# Patient Record
Sex: MALE | Race: WHITE
[De-identification: names, ages, dates, MRNs, and addresses within clinical notes are randomized per-mention and may not be internally consistent; named-entity substitution may affect disease eponyms.]

---

## 2016-05-25 VITALS — DIASTOLIC BLOOD PRESSURE: 86 MMHG | SYSTOLIC BLOOD PRESSURE: 112 MMHG

## 2017-01-06 ENCOUNTER — HOSPITAL ENCOUNTER (OUTPATIENT)
Dept: HOSPITAL 44 - RAD | Age: 62
End: 2017-01-06
Attending: FAMILY MEDICINE
Payer: MEDICARE

## 2017-01-06 DIAGNOSIS — M25.511: Primary | ICD-10-CM

## 2017-01-06 PROCEDURE — 73030 X-RAY EXAM OF SHOULDER: CPT

## 2017-01-06 NOTE — DIAGNOSTIC IMAGING REPORT
[facility]

[address] [facility].[address] [address]

 

 

 

 

Report Submission Date: 2017 3:25:24 PM CST

Patient       Study

Name:   FILEMON WEN       Date:   2017 2:34:09 PM CST

MRN:   [id number]       Modality Type:   CR

Gender:   M       Description:   SHOULDER

:   51       Institution:   [facility]

Physician:   TERRENCE HERNANDEZ            

 

 

Right shoulder - three views 

Clinical history:  Pain for 2 months decreased range of motion. 

Findings:  Examination right shoulder multiple views demonstrates degenerative 
changes in the acromioclavicular joint with mild narrowing of the joint space 
and osteophyte formation.  The glenohumeral relationship appears anatomic.  
There is no evident fracture and no lytic or blastic lesion. 

Impression: 

1.  Degenerative changes in the acromioclavicular joint.

 

Electronically signed on 2017 3:25:24 PM CST by:

Shant DEL CID

## 2017-03-31 ENCOUNTER — HOSPITAL ENCOUNTER (EMERGENCY)
Dept: HOSPITAL 44 - ED | Age: 62
Discharge: ANOTHER HOSPITAL | End: 2017-03-31
Payer: MEDICARE

## 2017-03-31 VITALS
SYSTOLIC BLOOD PRESSURE: 130 MMHG | DIASTOLIC BLOOD PRESSURE: 93 MMHG | SYSTOLIC BLOOD PRESSURE: 130 MMHG | DIASTOLIC BLOOD PRESSURE: 93 MMHG | DIASTOLIC BLOOD PRESSURE: 93 MMHG | SYSTOLIC BLOOD PRESSURE: 130 MMHG
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
## 2017-03-31 DIAGNOSIS — T18.128A: Primary | ICD-10-CM

## 2017-03-31 PROCEDURE — 99284 EMERGENCY DEPT VISIT MOD MDM: CPT

## 2017-03-31 PROCEDURE — S1016 NON-PVC INTRAVENOUS ADMINIST: HCPCS

## 2017-03-31 PROCEDURE — 96374 THER/PROPH/DIAG INJ IV PUSH: CPT

## 2017-03-31 PROCEDURE — 99283 EMERGENCY DEPT VISIT LOW MDM: CPT

## 2017-03-31 PROCEDURE — 96376 TX/PRO/DX INJ SAME DRUG ADON: CPT

## 2017-03-31 NOTE — ED PHYSICIAN DOCUMENTATION
General Adult





- HISTORIAN


Historian: patient





- HPI


Stated Complaint: food lodged in esophagus


Chief Complaint: General Adult


Onset: hours


Timing: still present


Severity: mild


Further Comments: yes (Pt is a 60 yo male who says he has food lodged in his 

esophagus.  Pt appears comfortable and has no airway compromise.  Pt has had 

this problem before.  Pt states that he has had balloon dilitation of his 

esophagus in the past.)





- ROS


CONST: no problems


EYES/ENT: none


CVS/RESP: none


GI/: other (food lodged in esophagus)


MS/SKIN/LYMPH: none





- PAST HX


Past History: other (GERD, HTN, esophageal dilitation)





- SOCIAL HX


Smoking History: non-smoker





- FAMILY HX


Family History: No





- VITAL SIGNS


Vital Signs: 





 Vital Signs











Temp Pulse Resp BP Pulse Ox


 


          112/86    


 


          05/24/16 22:35   














- REVIEWED ASSESSMENTS


Nursing Assessment  Reviewed: Yes


Vitals Reviewed: Yes





<Tyrese Briones - Last Filed: 03/31/17 19:01>





- VITAL SIGNS


Vital Signs: 





 Vital Signs











Temp Pulse Resp BP Pulse Ox


 


 97.5 F L  96 H  16   118/95   99 


 


 03/31/17 18:15  03/31/17 18:15  03/31/17 18:15  03/31/17 18:15  03/31/17 18:15














<AUDRA RODRIGUEZ - Last Filed: 03/31/17 21:01>





- PAST HX


Allergies/Adverse Reactions: 


 Allergies











Allergy/AdvReac Type Severity Reaction Status Date / Time


 


codeine [Codeine] Allergy Severe Anaphylaxis Verified 03/31/17 18:24














Home Medications: 


 Ambulatory Orders











 Medication  Instructions  Recorded


 


Ranitidine HCl 150 mg PO QDAY 03/31/17














Progress





- Progress


Progress: 





Glucagon 1 mg IV





Care transferred to Audra Rodriguez at 1900.





Will repeat Glucagon in 30 min.  If sx not resolved will transfer to [facility]  

Dr. Burk.  Case discussed with Dr. Burk.  GI, [facility]





<Tyrese Briones - Last Filed: 03/31/17 19:01>





- Progress


Progress: 


Patient walking in hallway. 





Patient spitting in cup, unable to swallow his secretions. Extended wait time 

due to code in ER





2000 Second glucagon given, patient walking around





2040 Spoke with DR Burk at [facility], patient not accepted,  admission advisor to 

speak with ER attending. 





2100 Patient accepted by Dr Heredia, ER attending.  Patient updated on plan of 

care.  Explained he would need a ride home from [facility], states he is called a 

friend.  Explained he would be discharged home post procedure.








<AUDRA RODRIGUEZ - Last Filed: 03/31/17 21:01>





ED Results Lab/Radiology





- Orders


Orders: 





 ED Orders











 Category Date Time Status


 


 Amoxicillin/Potassium Clav [Augmentin 875Mg/125Mg] Med  03/31/17 18:40 

Discontinued





 1 each PO NOW ONE   


 


 Glucagon,Human Recombinant [Glucagen] Med  03/31/17 18:46 Discontinued





 1 mg .ROUTE .STK-MED ONE   


 


 Glucagon,Human Recombinant [Glucagen] Med  03/31/17 18:45 Discontinued





 1 mg IVP NOW ONE   


 


 Glucagon,Human Recombinant [Glucagen] Med  03/31/17 19:56 Discontinued





 1 mg IVP NOW ONE   














<AUDRA RODRIGUEZ - Last Filed: 03/31/17 21:01>





General Adult Physical Exam





- PHYSICAL EXAM


GENERAL APPEARANCE: no distress


EENT: pharynx normal


NECK: normal inspection, supple


RESPIRATORY: no resp distress, chest non-tender


CVS: reg rate & rhythm, heart sounds normal


ABDOMEN: soft, no organomegaly, normal bowel sounds


BACK: normal inspection


SKIN: warm/dry, normal color


EXTREMITIES: non-tender, normal range of motion, no evidence of injury


NEURO: oriented X3, motor nml, sensation nml





<Tyrese Briones - Last Filed: 03/31/17 19:01>





Discharge


Decision to Admit: NO





<Tyrese Briones - Last Filed: 03/31/17 19:01>


Decision to Admit: NO


Decision Time: 21:00





<AUDRA RODRIGUEZ - Last Filed: 03/31/17 21:01>


Clincal Impression: 


 food lodged in esophagus





Referrals: 


Zafar Daniels MD [Primary Care Provider] - 


Home Medications: 


Ambulatory Orders





Ranitidine HCl 150 mg PO QDAY 03/31/17 








Condition: Stable


Disposition: 02 XFER SHT-[facility]

## 2017-05-24 ENCOUNTER — HOSPITAL ENCOUNTER (OUTPATIENT)
Dept: HOSPITAL 44 - RT | Age: 62
End: 2017-05-24
Attending: FAMILY MEDICINE
Payer: MEDICARE

## 2017-05-24 DIAGNOSIS — J96.10: Primary | ICD-10-CM

## 2017-07-03 ENCOUNTER — HOSPITAL ENCOUNTER (OUTPATIENT)
Dept: HOSPITAL 44 - RAD | Age: 62
End: 2017-07-03
Attending: FAMILY MEDICINE
Payer: COMMERCIAL

## 2017-07-03 DIAGNOSIS — M25.551: Primary | ICD-10-CM

## 2017-07-03 PROCEDURE — 73521 X-RAY EXAM HIPS BI 2 VIEWS: CPT

## 2017-07-03 NOTE — DIAGNOSTIC IMAGING REPORT
[facility]

[address] [facility].[address] [address]

 

 

 

 

Report Submission Date: Jul 3, 2017 2:19:16 PM CDT

Patient       Study

Name:   FILEMON WEN       Date:   Jul 3, 2017 1:55:26 PM CDT

MRN:   [id number]       Modality Type:   CR

Gender:   M       Description:   PELVIS

:   51       Institution:   [facility]

Physician:   DAVID OCAMPO - YUNIEL

         

 

 

Examination: Plain film pelvis/hips 



History: Discomfort 



Comparison exams: None provided 



Findings: 5 views of the pelvis/hips demonstrates normal cortical margins. No 
fracture.  No dislocation.  Superior and inferior pubic rami and iliac wings 
are without abnormality. 



Impression: No acute osseous process.

 

Electronically signed on Jul 3, 2017 2:19:16 PM CDT by:

Abad DEL CID

## 2017-08-10 ENCOUNTER — HOSPITAL ENCOUNTER (EMERGENCY)
Dept: HOSPITAL 44 - ED | Age: 62
Discharge: HOME | End: 2017-08-10
Payer: COMMERCIAL

## 2017-08-10 VITALS — SYSTOLIC BLOOD PRESSURE: 138 MMHG | DIASTOLIC BLOOD PRESSURE: 92 MMHG

## 2017-08-10 DIAGNOSIS — M43.6: Primary | ICD-10-CM

## 2017-08-10 PROCEDURE — 72040 X-RAY EXAM NECK SPINE 2-3 VW: CPT

## 2017-08-10 PROCEDURE — 96372 THER/PROPH/DIAG INJ SC/IM: CPT

## 2017-08-10 PROCEDURE — 99283 EMERGENCY DEPT VISIT LOW MDM: CPT

## 2017-08-10 RX ADMIN — KETOROLAC TROMETHAMINE ONE MG: 30 INJECTION, SOLUTION INTRAMUSCULAR at 12:21

## 2017-08-10 RX ADMIN — ORPHENADRINE CITRATE ONE MG: 30 INJECTION, SOLUTION INTRAMUSCULAR; INTRAVENOUS at 12:20

## 2017-08-10 NOTE — ED PHYSICIAN DOCUMENTATION
Neck Injury/Pain





- HISTORIAN


Historian: patient





- HPI


Stated Complaint: neck pain


Chief Complaint: Neck Pain


Additional Information: 





x 2 days


Onset: days ago (2)


Duration: continues in ED


Recent Injury: No


Context: other (woke this way)


Where: home


Other Injuries: other (no injury)


Severity: moderate


Quality: sharp


Front/Back of Body, Lg (Color): 


  __________________________














  __________________________





 1 - pain





 2 - pain





Associated Symptoms: other (none)


Exacerbated By: movement of neck


Relieved By: remaining still


Further Comments: no





- ROS


NEURO/PSYCH: denies: difficulty with speech, anxiety, depression


EYES/ENT: none


CVS/RESP: none


CONST: no problems


GI/: denies: nausea, vomiting, problems urinating, incontinence


MS/SKIN/LYMPH: none





- PAST HX


Past History: other (htn)


Cardiac Risk Factors: hypertension


Surgeries/Procedures: none


CT/MRI: No


Immunizations: referred to PCP


Allergies/Adverse Reactions: 


 Allergies











Allergy/AdvReac Type Severity Reaction Status Date / Time


 


codeine [Codeine] Allergy Severe Anaphylaxis Verified 08/10/17 11:30














Home Medications: 


 Ambulatory Orders











 Medication  Instructions  Recorded


 


Losartan/Hydrochlorothiazide 1 each PO QDAY 08/10/17





[Hyzaar 100-25 Tablet]  


 


Metoprolol Tartrate [Lopressor] 50 mg PO DAILY 08/10/17


 


Pantoprazole Sodium [Protonix] 40 mg PO 0700 08/10/17


 


Tamsulosin HCl [Flomax] 0.4 mg PO [id number] 08/10/17














- SOCIAL HX


Smoking History: non-smoker


Alcohol Use: none


Drug Use: none





- FAMILY HX


Family History: no significant history





- VITAL SIGNS


Vital Signs: 





 Vital Signs











Temp Pulse Resp BP Pulse Ox


 


 97.3 F L  89   16   138/92   96 


 


 08/10/17 12:28  08/10/17 12:28  08/10/17 12:28  08/10/17 12:28  08/10/17 12:28














- REVIEWED ASSESSMENT


Nursing Assessment  Reviewed: Yes


Vitals Reviewed: Yes





Progress





- Results/Orders


Results/Orders: 





x-ray c-spine ordered





- Progress


Progress: 





pt. stable entire time in er, given norflex 60 mg im, toradol 60 mg im in er





Critical Care Note





- Critical Care Note


Total Time (mins): 0





ED Results Lab/Radiology





- Lab Results


Lab Results: 





none ordered





- Radiology


Radiology Impressions: 





ddd, djd, no acute fx





- Orders


Orders: 





 ED Orders











 Category Date Time Status


 


 C SPINE 2 OR 3 VIEWS [RAD] Stat Exams  08/10/17 Ordered


 


 Ketorolac Tromethamine [Toradol] Med  08/10/17 12:13 Discontinued





 60 mg IM NOW ONE   


 


 Orphenadrine Citrate [Norflex] Med  08/10/17 12:13 Discontinued





 60 mg IM NOW ONE   














Neck Injury/Pain





- Physical Exam


General Appearance: alert, moderate distress


EENT: nml ENT inspection, pharynx nml


Neck: thyroid nml, muscle spasm (bilat paracervical muscles), decreased ROM


Nexus Criteria: Nexus criteria neg


Back: non-tender, painless ROM


Respiratory: chest non-tender, breath sounds nml


CVS: heart sounds nml, bilateral pulses nml


Abdomen: non-tender, no organomegaly, nml bowel sounds, no distention


Skin: warm/dry, normal color


Extremities: non-tender, normal range of motion, no evidence of injury, no edema


Neuro/Psych: oriented x3, CN's nml as tested, sensation nml, motor nml, mood/

affect nml,  nml, reflexes nml,  symmetrical





Discharge


Clincal Impression: 


 Torticollis, acute





Referrals: 


Primary Doctor,No [Primary Care Provider] - 2 Days


Home Medications: 


Ambulatory Orders





Losartan/Hydrochlorothiazide [Hyzaar 100-25 Tablet] 1 each PO QDAY 08/10/17 


Metoprolol Tartrate [Lopressor] 50 mg PO DAILY 08/10/17 


Pantoprazole Sodium [Protonix] 40 mg PO 0700 08/10/17 


Tamsulosin HCl [Flomax] 0.4 mg PO [id number] 08/10/17 








Comments: 





discharged with scripts for parafon forte dsc 500 mg 1 p.i. qid #20, toradol 10 

1 p.o. qid #20


Condition: Stable


Disposition: 01 HOME, SELF-CARE


Decision to Admit: NO


Decision Time: 12:20

## 2017-08-10 NOTE — DIAGNOSTIC IMAGING REPORT
ALLIE SANTOS 

[facility]

[address] [facility].[address] [address]

 

 

 

 

Report Submission Date: Aug 10, 2017 12:31:53 PM CDT

Patient       Study

Name:   FILEMON WEN       Date:   Aug 10, 2017 11:45:17 AM CDT

MRN:   [id number]       Modality Type:   CR

Gender:   M       Description:   SPINE

:   51       Institution:   [facility]

Physician:   ALLIE SANTOS

         

 

 

Cervical spine -three views 



CLINICAL HISTORY:   

Upper neck pain for 3 days.   



FINDINGS:   

Examination cervical spine AP, lateral, lateral swimmer's and open-mouth views 
demonstrates the vertebrae to be anatomically aligned.  Prevertebral soft 
tissues are within normal limits.  The C1-2 articulation is normal and the base 
the odontoid is intact.  There is no evident fracture. 



IMPRESSION:   

No fracture.

 

Electronically signed on Aug 10, 2017 12:31:53 PM CDT by:

Shant DEL CID

## 2017-08-11 ENCOUNTER — HOSPITAL ENCOUNTER (EMERGENCY)
Dept: HOSPITAL 44 - ED | Age: 62
Discharge: HOME | End: 2017-08-11
Payer: COMMERCIAL

## 2017-08-11 VITALS — DIASTOLIC BLOOD PRESSURE: 80 MMHG | SYSTOLIC BLOOD PRESSURE: 130 MMHG

## 2017-08-11 DIAGNOSIS — M43.6: Primary | ICD-10-CM

## 2017-08-11 PROCEDURE — 99283 EMERGENCY DEPT VISIT LOW MDM: CPT

## 2017-08-11 NOTE — ED PHYSICIAN DOCUMENTATION
Neck Injury/Pain





- HISTORIAN


Historian: patient





- HPI


Stated Complaint: neck pain


Chief Complaint: Neck Pain


Additional Information: 





was here yesterday, states meds not working


Onset: days ago (5)


Duration: continues in ED


Recent Injury: Yes


Context: other (unknown)


Where: home


Other Injuries: neck


Severity: moderate


Quality: sharp


Associated Symptoms: denies: fever, chills, sweating, headache, chest pain, 

weakness, numbness, tingling


Exacerbated By: movement of neck


Relieved By: nothing


Further Comments: no





- ROS


NEURO/PSYCH: denies: difficulty with speech, anxiety, depression


EYES/ENT: none


CVS/RESP: none


CONST: no problems


GI/: denies: nausea, vomiting, problems urinating, incontinence


MS/SKIN/LYMPH: none





- PAST HX


Past History: arthritis, neck pain


Surgeries/Procedures: none


Immunizations: referred to PCP


Allergies/Adverse Reactions: 


 Allergies











Allergy/AdvReac Type Severity Reaction Status Date / Time


 


codeine [Codeine] Allergy Severe Anaphylaxis Verified 08/11/17 10:49














Home Medications: 


 Ambulatory Orders











 Medication  Instructions  Recorded


 


Losartan/Hydrochlorothiazide 1 each PO QDAY 08/10/17





[Hyzaar 100-25 Tablet]  


 


Metoprolol Tartrate [Lopressor] 50 mg PO DAILY 08/10/17


 


Pantoprazole Sodium [Protonix] 40 mg PO 0700 08/10/17


 


Tamsulosin HCl [Flomax] 0.4 mg PO [id number] 08/10/17














- SOCIAL HX


Smoking History: non-smoker


Alcohol Use: none


Drug Use: none





- FAMILY HX


Family History: no significant history





- VITAL SIGNS


Vital Signs: 


 Vital Signs











Temp Pulse Resp BP Pulse Ox


 


    80   14   130/80   98 


 


    08/11/17 11:40  08/11/17 11:40  08/11/17 11:40  08/11/17 11:40














- REVIEWED ASSESSMENT


Nursing Assessment  Reviewed: Yes


Vitals Reviewed: Yes





Progress





- Results/Orders


Results/Orders: 





no testing ordered





- Progress


Progress: 





pt. given percocet 5/325 p.o. in er





Critical Care Note





- Critical Care Note


Total Time (mins): 0





ED Results Lab/Radiology





- Lab Results


Lab Results: 


none ordered





- Radiology


Radiology Impressions: 


none ordered today, ordered and reviewed yesterday





- Orders


Orders: 


 ED Orders











 Category Date Time Status


 


 oxyCODONE HCL/ACETAMINOPHEN [Percocet 5-325 mg Tablet] Med  08/11/17 11:28 

Discontinued





 1 each PO NOW ONE   














Neck Injury/Pain





- Physical Exam


General Appearance: alert, moderate distress


EENT: nml ENT inspection, pharynx nml, scleral icterus


Neck: thyroid nml, muscle spasm, decreased ROM


Back: non-tender, painless ROM.  No: vertebral point-tendernes, CVA tenderness, 

muscle spasm


Respiratory: chest non-tender, breath sounds nml


CVS: heart sounds nml, bilateral pulses nml


Abdomen: non-tender, no organomegaly, nml bowel sounds, no distention


Skin: warm/dry, normal color


Extremities: non-tender, normal range of motion, no evidence of injury, no edema


Neuro/Psych: oriented x3, CN's nml as tested, sensation nml, motor nml, mood/

affect nml,  nml, reflexes nml,  symmetrical





Discharge


Clincal Impression: 


 Torticollis





Referrals: 


Primary Doctor,No [Primary Care Provider] - 2 Days


Home Medications: 


Ambulatory Orders





Losartan/Hydrochlorothiazide [Hyzaar 100-25 Tablet] 1 each PO QDAY 08/10/17 


Metoprolol Tartrate [Lopressor] 50 mg PO DAILY 08/10/17 


Pantoprazole Sodium [Protonix] 40 mg PO 0700 08/10/17 


Tamsulosin HCl [Flomax] 0.4 mg PO [id number] 08/10/17 








Comments: 





discharged in stable condition with script for percocet 5/325 1 p.o. qid prn 

pain


Condition: Stable


Disposition: 01 HOME, SELF-CARE


Decision to Admit: NO


Decision Time: 11:34

## 2017-10-03 ENCOUNTER — HOSPITAL ENCOUNTER (EMERGENCY)
Dept: HOSPITAL 44 - ED | Age: 62
Discharge: HOME | End: 2017-10-03
Payer: COMMERCIAL

## 2017-10-03 VITALS — SYSTOLIC BLOOD PRESSURE: 111 MMHG | DIASTOLIC BLOOD PRESSURE: 82 MMHG

## 2017-10-03 DIAGNOSIS — I10: Primary | ICD-10-CM

## 2017-10-03 PROCEDURE — 99283 EMERGENCY DEPT VISIT LOW MDM: CPT

## 2017-10-03 NOTE — ED PHYSICIAN DOCUMENTATION
General Adult





- HISTORIAN


Historian: patient





- HPI


Stated Complaint: high blood pressure 


Chief Complaint: General Adult


Additional Information: 





hypertension reading at home 135/119


Onset: hours (1)


Timing: better


Severity: other (no pain )


Further Comments: no


Last known Well Date: 10/03/17


Last Known Well Time: 13:00


Last known Well Code/Unknown Code: Unknown





- ROS


CONST: no problems


EYES/ENT: none


CVS/RESP: none


GI/: none


MS/SKIN/LYMPH: none


NEURO/PSYCH: denies: headache, fainting, dizziness





- PAST HX


Past History: other (HTN )


Other History: none


Surgeries/Procedures: none


Immunizations: referred to PCP


Allergies/Adverse Reactions: 


 Allergies











Allergy/AdvReac Type Severity Reaction Status Date / Time


 


codeine [Codeine] Allergy Severe Anaphylaxis Verified 08/11/17 10:49














Home Medications: 


 Ambulatory Orders











 Medication  Instructions  Recorded


 


Losartan/Hydrochlorothiazide 1 each PO QDAY 08/10/17





[Hyzaar 100-25 Tablet]  


 


Metoprolol Tartrate [Lopressor] 50 mg PO DAILY 08/10/17














- SOCIAL HX


Smoking History: non-smoker


Alcohol Use: none


Drug Use: none





- FAMILY HX


Family History: No





- VITAL SIGNS


Vital Signs: 





 Vital Signs











Temp Pulse Resp BP Pulse Ox


 


          130/80    


 


          08/11/17 11:40   














- REVIEWED ASSESSMENTS


Nursing Assessment  Reviewed: Yes


Vitals Reviewed: Yes





General Adult Physical Exam





- PHYSICAL EXAM


GENERAL APPEARANCE: no distress


EENT: eye inspection normal, ENT inspection normal


NECK: normal inspection


CVS: reg rate & rhythm, heart sounds normal, equal pulses, no murmur


ABDOMEN: soft, no organomegaly, normal bowel sounds, no distension


BACK: normal inspection


SKIN: warm/dry


EXTREMITIES: non-tender, normal range of motion


NEURO: oriented X3, CN's nml as tested





Discharge


Clincal Impression: 


 Accelerated hypertension





Referrals: 


Annie Lewis PRN [Primary Care Provider] - 2 Days


Condition: Stable


Disposition: 01 HOME, SELF-CARE


Decision to Admit: NO


Date of Decison to Admit: 10/03/17


Decision Time: 14:21

## 2017-10-14 ENCOUNTER — HOSPITAL ENCOUNTER (EMERGENCY)
Dept: HOSPITAL 44 - ED | Age: 62
Discharge: HOME | End: 2017-10-14
Payer: COMMERCIAL

## 2017-10-14 VITALS — DIASTOLIC BLOOD PRESSURE: 58 MMHG | SYSTOLIC BLOOD PRESSURE: 119 MMHG

## 2017-10-14 DIAGNOSIS — J45.41: Primary | ICD-10-CM

## 2017-10-14 PROCEDURE — 71020: CPT

## 2017-10-14 PROCEDURE — 96372 THER/PROPH/DIAG INJ SC/IM: CPT

## 2017-10-14 PROCEDURE — 99283 EMERGENCY DEPT VISIT LOW MDM: CPT

## 2017-10-14 RX ADMIN — IPRATROPIUM BROMIDE AND ALBUTEROL SULFATE ONE ML: .5; 3 SOLUTION RESPIRATORY (INHALATION) at 01:50

## 2017-10-14 RX ADMIN — DEXAMETHASONE SODIUM PHOSPHATE ONE MG: 4 INJECTION, SOLUTION INTRAMUSCULAR; INTRAVENOUS at 02:18

## 2017-10-14 RX ADMIN — BENZONATATE ONE MG: 100 CAPSULE ORAL at 02:21

## 2017-10-14 RX ADMIN — BUDESONIDE ONE MG: 0.5 SUSPENSION RESPIRATORY (INHALATION) at 02:00

## 2017-10-14 NOTE — DIAGNOSTIC IMAGING REPORT
ALLIE SANTOS 

[facility]

[address] [facility] [address] [address]

 

 

 

 

Report Submission Date: Oct 14, 2017 2:36:45 AM CDT

Patient       Study

Name:   FILEMON WEN       Date:   Oct 14, 2017 2:05:57 AM CDT

MRN:   [id number]       Modality Type:   CR

Gender:   M       Description:   CHEST

:   51       Institution:   [facility]

Physician:   ALLIE SNATOS

         

 

 

Chest 2 views 



History: Cough and shortness of breath 



Findings: Low lung volumes are observed with bronchovascular crowding at the 
lung bases. Basilar bronchitis cannot be excluded. Heart size and pulmonary 
vascularity are normal. No pleural effusions are observed. Osseous structures 
are unremarkable. 



Impression: Limited exam due to expiratory technique. Basilar bronchitis cannot 
be excluded.

 

Electronically signed on Oct 14, 2017 2:36:45 AM CDT by:

Christ DEL CID

## 2017-10-14 NOTE — ED PHYSICIAN DOCUMENTATION
Upper Respiratory Symptoms





- HISTORIAN


Historian: patient





- HPI


Stated Complaint: cough


Chief Complaint: Cough/ Upper Respiratory


Additional Information: 





cough x 1 week, occ. productive


Onset: days ago (7)


Duration: sudden-Onset


Context: denies: recent foreign travel, insect bite(s), tick(s), recent 

chemotherapy, multiple patients, same sx


Severity: moderate


Associated Symptoms: productive cough


Worsened by Deep Breath: Yes


Further Comments: no





- ROS


CONST/EYES: denies: weakness, eye redness, eye itching


CVS/RESP: other (cough, worse at night)


LYMPH: denies: leg swelling, rash, swollen glands, ankle swelling


GI/: none


NEURO/PSYCH: denies: fainting, dizziness, confusion, anxiety, depression


MS/SKIN: denies: joint pain, muscle aches, rash





- PAST HX


Lung Disease: asthma


PE Risk Factors: hypertension


Surgeries/Procedures: none


Immunizations: referred to PCP


Allergies/Adverse Reactions: 


 Allergies











Allergy/AdvReac Type Severity Reaction Status Date / Time


 


codeine [Codeine] Allergy Severe Anaphylaxis Verified 10/14/17 01:46














Home Medications: 


 Ambulatory Orders











 Medication  Instructions  Recorded


 


Losartan/Hydrochlorothiazide 1 each PO QDAY 08/10/17





[Hyzaar 100-25 Tablet]  


 


Metoprolol Tartrate [Lopressor] 50 mg PO DAILY 08/10/17














- SOCIAL HX


Smoking History: non-smoker


Alcohol Use: none


Drug Use: none





- FAMILY HX


Family History: no significant history





- VITAL SIGNS


Vital Signs: 


 Vital Signs











Temp Pulse Resp BP Pulse Ox


 


 97.8 F   80   16   119/53   95 


 


 10/14/17 01:35  10/14/17 01:35  10/14/17 01:35  10/14/17 01:35  10/14/17 01:35














- REVIEWED ASSESSMENTS


Nursing Assessment  Reviewed: Yes


Vitals Reviewed: Yes





Progress





- Results/Orders


Results/Orders: 


cxr ordered





- Progress


Progress: 





pt. given pulmicort 0.5 mg via nebulizer, duoneb via nebulizer, 80 mg depo 

medrol, 8 mg decadron, 1.2 million units bicillinim in er





Critical Care Note





- Critical Care Note


Total Time (mins): 0





ED Results Lab/Radiology





- Lab Results


Lab Results: 





none ordered





- Radiology


Radiology Impressions: 





cxr clear of infiltrates





- Orders


Orders: 


 ED Orders











 Category Date Time Status


 


 CHEST 2 VIEW [CHEST P.A.&LAT 2 VIEWS] [RAD] Stat Exams  10/14/17 Taken


 


 Benzonatate [Tessalon] Med  10/14/17 01:52 Discontinued





 200 mg PO NOW ONE   


 


 Budesonide [Pulmicort] Med  10/14/17 01:49 Discontinued





 0.5 mg NEB .STK-MED ONE   


 


 Budesonide [Pulmicort] Med  10/14/17 01:50 Discontinued





 0.5 mg NEB 1T ONE   


 


 Dexamethasone Sod Phosphate [Decadron] Med  10/14/17 01:49 Discontinued





 8 mg IM NOW ONE   


 


 Ipratropium/Albuterol Sulfate [Duoneb] Med  10/14/17 01:48 Discontinued





 3 ml NEB .STK-MED ONE   


 


 Ipratropium/Albuterol Sulfate [Duoneb] Med  10/14/17 01:49 Discontinued





 3 ml NEB NOW ONE   


 


 Penicillin G Benzathine [Bicillin l-A] Med  10/14/17 02:33 Once





 1,200,000 units IM NOW ONE   


 


 methylPREDNISolone ACETATE [Depo-Medrol] Med  10/14/17 01:51 Discontinued





 80 mg IM NOW ONE   














Upper Respiratory Symptoms





- EXAM


General Appearance: mild distress


EENT: eyes nml inspection, nml ENT inspection, lids & conjunct. nml, PERRL, ear 

nml.  No: pain over sinuses


Neck: normal inspection, thyroid normal, supple


Respiratory: no resp. distress, speaks full sentences, wheezes.  No: retractions

, splinting, accessory muscle use


Abdomen: non-tender, no organomegaly, nml bowel sounds, no distention


CVS: reg rate & rhythm, heart sounds normal, equal pulses, no murmur, no gallop

, PMI nml, no JVD


Skin: color nml, no rash, warm,dry


Extremities: non-tender, normal range of motion, no evidence of injury, no edema


Neuro/Psych: oriented x3, neuro intact, mood/affect nml





Discharge


Clincal Impression: 


Asthmatic bronchitis


Qualifiers:


 Asthma severity: moderate Asthma persistence: persistent Asthma complication 

type: with acute exacerbation Qualified Code(s): J45.41 - Moderate persistent 

asthma with (acute) exacerbation





Referrals: 


Annie Lewis, PRN [Primary Care Provider] - 2 Days


Comments: 





Discharged in stable condition with script for nebulizer.  He has albuterol 

prefills at home he will use.


Condition: Stable


Disposition: 01 HOME, SELF-CARE


Decision to Admit: NO


Decision Time: 02:15

## 2017-11-10 ENCOUNTER — HOSPITAL ENCOUNTER (EMERGENCY)
Dept: HOSPITAL 44 - ED | Age: 62
Discharge: HOME | End: 2017-11-10
Payer: COMMERCIAL

## 2017-11-10 VITALS — DIASTOLIC BLOOD PRESSURE: 82 MMHG | SYSTOLIC BLOOD PRESSURE: 110 MMHG

## 2017-11-10 DIAGNOSIS — R25.2: Primary | ICD-10-CM

## 2017-11-10 LAB
BASOPHILS NFR BLD: 0.6 % (ref 0–1.5)
EGFR (AFRICAN): > 60
EGFR (NON-AFRICAN): > 60
EOSINOPHIL NFR BLD: 6.5 % (ref 0–6.8)
MCH RBC QN AUTO: 30.6 PG (ref 28–34)
MCV RBC AUTO: 92.1 FL (ref 80–100)
MONOCYTES %: 6 % (ref 0–11)
NEUTROPHILS #: 5.3 # K/UL (ref 1.4–7.7)

## 2017-11-10 PROCEDURE — 93005 ELECTROCARDIOGRAM TRACING: CPT

## 2017-11-10 PROCEDURE — 80053 COMPREHEN METABOLIC PANEL: CPT

## 2017-11-10 PROCEDURE — 81002 URINALYSIS NONAUTO W/O SCOPE: CPT

## 2017-11-10 PROCEDURE — 71020: CPT

## 2017-11-10 PROCEDURE — S1016 NON-PVC INTRAVENOUS ADMINIST: HCPCS

## 2017-11-10 PROCEDURE — 96361 HYDRATE IV INFUSION ADD-ON: CPT

## 2017-11-10 PROCEDURE — 84443 ASSAY THYROID STIM HORMONE: CPT

## 2017-11-10 PROCEDURE — 96360 HYDRATION IV INFUSION INIT: CPT

## 2017-11-10 PROCEDURE — 85025 COMPLETE CBC W/AUTO DIFF WBC: CPT

## 2017-11-10 PROCEDURE — 99283 EMERGENCY DEPT VISIT LOW MDM: CPT

## 2017-11-10 PROCEDURE — 85651 RBC SED RATE NONAUTOMATED: CPT

## 2017-11-10 RX ADMIN — RETINOL, ERGOCALCIFEROL, .ALPHA.-TOCOPHEROL ACETATE, DL-, PHYTONADIONE, ASCORBIC ACID, NIACINAMIDE, RIBOFLAVIN 5-PHOSPHATE SODIUM, THIAMINE HYDROCHLORIDE, PYRIDOXINE HYDROCHLORIDE, DEXPANTHENOL, BIOTIN, FOLIC ACID, AND CYANOCOBALAMIN ONE MLS/HR: KIT at 19:10

## 2017-11-10 RX ADMIN — CYCLOBENZAPRINE HYDROCHLORIDE ONE: 5 TABLET, FILM COATED ORAL at 20:58

## 2017-11-10 RX ADMIN — RETINOL, ERGOCALCIFEROL, .ALPHA.-TOCOPHEROL ACETATE, DL-, PHYTONADIONE, ASCORBIC ACID, NIACINAMIDE, RIBOFLAVIN 5-PHOSPHATE SODIUM, THIAMINE HYDROCHLORIDE, PYRIDOXINE HYDROCHLORIDE, DEXPANTHENOL, BIOTIN, FOLIC ACID, AND CYANOCOBALAMIN ONE MLS/HR: KIT at 20:12

## 2017-11-10 NOTE — ED PHYSICIAN DOCUMENTATION
General Adult





- HISTORIAN


Historian: patient





- HPI


Stated Complaint: cramping


Chief Complaint: General Adult


Additional Information: 





pt has severe intermittent muscle cramps rosendo of abd wall neck legs and 

generalized. THC helps but 'OUT'


Onset: other (several months progressive occ has to sleep on floor-has RLS)


Timing: worse


Severity: moderate, severe





- ROS


CONST: no problems (as w/cc)


EYES/ENT: denies: problems with vision


CVS/RESP: none, other (occ dep edema belowknees only =- gone of am)


GI/: abdominal pain.  denies: vomiting, nausea, diarrhea


MS/SKIN/LYMPH: neck pain, leg swelling, leg pain, back pain, other (as above)





- PAST HX


Past History: hypertension, other (gerd)


Surgeries/Procedures: none





- SOCIAL HX


Smoking History: non-smoker


Alcohol Use: rarely (wine)


Drug Use: marijuana (used to do several drugs incl heroin)





- FAMILY HX


Family History: Yes (none significant)





- VITAL SIGNS


Vital Signs: 





 Vital Signs











Temp Pulse Resp BP Pulse Ox


 


 98.4 F   96 H  20   113/85   96 


 


 11/10/17 18:05  11/10/17 18:05  11/10/17 18:05  11/10/17 18:05  11/10/17 18:05














- REVIEWED ASSESSMENTS


Nursing Assessment  Reviewed: Yes


Vitals Reviewed: Yes





<Norberto Zepeda - Last Filed: 11/10/17 18:49>





- VITAL SIGNS


Vital Signs: 





 Vital Signs











Temp Pulse Resp BP Pulse Ox


 


 98.4 F   96 H  20   113/85   96 


 


 11/10/17 18:05  11/10/17 18:05  11/10/17 18:05  11/10/17 18:05  11/10/17 18:05














<Tyrese Briones - Last Filed: 11/10/17 20:46>





- PAST HX


Allergies/Adverse Reactions: 


 Allergies











Allergy/AdvReac Type Severity Reaction Status Date / Time


 


codeine [Codeine] Allergy Severe Anaphylaxis Verified 11/10/17 18:09














Home Medications: 


 Ambulatory Orders











 Medication  Instructions  Recorded


 


Losartan/Hydrochlorothiazide 1 each PO QDAY 08/10/17





[Hyzaar 100-25 Tablet]  


 


Metoprolol Tartrate [Lopressor] 50 mg PO DAILY 08/10/17














Progress





- Progress


Progress: 





NS 1 L IVF x 2





Pt with ongoing muscle cramping > 1 yr.  Pt states he has had work-ups for this

, but does not know any details.  





TSH, sed rate pending.





Pt improved with IVF.





Flexeril 10 mg, 1/2 or 1 tablet q 8 hrs prn muscle cramping.  1 tablet --> home.





- EKG/XRAY/CT


EKG: NSR (HR=98; normal SD interval; LAD.)


XRAY: chest (No acute pulmonary process.)





<Tyrese Briones - Last Filed: 11/10/17 20:46>





ED Results Lab/Radiology





- Orders


Orders: 





 ED Orders











 Category Date Time Status


 


 CHEST P.A.&LAT 2 VIEWS [RAD] Stat Exams  11/10/17 Ordered


 


 ARTERIAL BLOOD GAS Stat Lab  11/10/17 Uncollected


 


 CBC/PLATELET/DIFF Routine Lab  11/10/17 Ordered


 


 TSH [THYROID STIMULATING HORMONE] Stat Lab  11/10/17 Ordered


 


 NORMAL SALINE @ 1000 MLS/HR ( 1000ml BOLUS) Med  11/10/17 18:48 Ordered





 0.9 % Sodium Chloride [Normal Saline] 1,000 ml   





 IV Q1H   


 


 EKG WITH COMPARISON Stat Ther  11/10/17 Ordered














<Norberto Zepeda - Last Filed: 11/10/17 18:49>





- Lab Results


Lab Results: 





 Lab Results











  11/10/17





  19:14


 


WBC  7.90 K/ul K/ul





   (4.00-12.00) 


 


RBC  5.50 M/ul H M/ul





   (3.90-5.20) 


 


Hgb  16.8 g/dL g/dL





   (12.0-18.0) 


 


Hct  50.6 % %





   (37.0-53.0) 


 


MCV  92.1 fl fl





   (80.0-100.0) 


 


MCH  30.6 pg pg





   (28.0-34.0) 


 


MCHC  33.2 g/dL g/dL





   (30.0-36.0) 


 


RDW  13.2 % %





   (11.3-14.3) 


 


Plt Count  231 K/mm3 K/mm3





   (130-400) 


 


Neut % (Auto)  67.4 % %





   (39.0-79.0) 


 


Lymph % (Auto)  16.5 % %





   (16.0-50.0) 


 


Mono % (Auto)  6.0 % %





   (0.0-11.0) 


 


Eos % (Auto)  6.5 % %





   (0.0-6.8) 


 


Baso % (Auto)  0.6 





   (0.0-1.5) 


 


Neut # (Auto)  5.3 # k/uL # k/uL





   (1.4-7.7) 


 


Lymph # (Auto)  1.3 # k/uL # k/uL





   (0.6-4.0) 


 


Mono # (Auto)  0.5 # k/uL # k/uL





   (0.0-0.9) 


 


Eos # (Auto)  0.5 # k/uL # k/uL





   (0.0-0.6) 


 


Baso # (Auto)  0.0 # k/uL # k/uL





   (0.0-0.5) 


 


Reactive Lymphs %  3.0 % %





   (0.0-5.0) 


 


Reactive Lymphs #  0.2 # k/uL # k/uL





   (0.0-0.8) 














- Orders


Orders: 





 ED Orders











 Category Date Time Status


 


 CHEST P.A.&LAT 2 VIEWS [RAD] Stat Exams  11/10/17 Taken


 


 ARTERIAL BLOOD GAS Stat Lab  11/10/17 Uncollected


 


 CBC/PLATELET/DIFF Routine Lab  11/10/17 19:14 Completed


 


 CMP Routine Lab  11/10/17 19:32 Received


 


 TSH [THYROID STIMULATING HORMONE] Stat Lab  11/10/17 19:14 Received


 


 sed rate [SEDIMENTATION RATE (ESR)] Routine Lab  11/10/17 19:33 Received


 


 0.9 % Sodium Chloride [Normal Saline] 1,000 ml Med  11/10/17 18:48 Discontinued





 IV Q1H   


 


 NORMAL SALINE @ 1000 MLS/HR ( 1000ml BOLUS) Med  11/10/17 20:01 Ordered





 0.9 % Sodium Chloride [Normal Saline] 1,000 ml   





 IV Q1H   


 


 EKG WITH COMPARISON Stat Ther  11/10/17 Ordered














<Tyrese Briones - Last Filed: 11/10/17 20:46>





General Adult Physical Exam





- PHYSICAL EXAM


GENERAL APPEARANCE: moderate distress


EENT: eye inspection normal


NECK: normal inspection, stiff neck.  No: thyromegaly, lymphadenopathy, carotid 

bruit


RESPIRATORY: No: no resp distress


CVS: reg rate & rhythm


ABDOMEN: soft, non-tender.  No: abnormal bowel sounds


BACK: CVA tenderness (R), CVA tenderness (L), other (rosendo neck tendernesss 

dbilat andesp spinous processes---abm not so tender right now).  No: no CVA 

tenderness


SKIN: warm/dry, normal color.  No: cyanosis, diaphoresis, jaundice


EXTREMITIES: normal range of motion, edema (mild)


NEURO: oriented X3, motor nml, sensation nml, mood/affect nml





<Norberto Zepeda - Last Filed: 11/10/17 18:49>





Discharge





<Norberto Zepeda - Last Filed: 11/10/17 18:49>


Decision to Admit: NO


Decision Time: 20:33





<Tyrese Briones - Last Filed: 11/10/17 20:46>


Clincal Impression: 


 muscle cramping





Referrals: 


Annie Lewis, PRN [Primary Care Provider] - 


Condition: Stable


Disposition: 01 HOME, SELF-CARE

## 2017-11-11 NOTE — DIAGNOSTIC IMAGING REPORT
GURWINDER SANABRIA 

[facility]

[address] [facility] [address]

[place], [place]. [address]

 

 

 

 

Report Submission Date: Nov 10, 2017 10:43:23 PM CST

Patient       Study

Name:   ALEXANDER UPTON       Date:   Nov 10, 2017 10:23:00 PM CST

MRN:   [id number]       Modality Type:   CR

Gender:   M       Description:   CHEST

:   14       Institution:   [facility]

Physician:   GURWINDER SANABRIA  HANNAH

         

 

 

Chest 2 views 



History: Cough and fever 



Findings: The exam is expiratory with bilateral bronchovascular crowding. There 
is no confluent infiltrate or pleural effusion. Heart size and pulmonary 
vascularity are normal. Bilateral bronchial wall thickening is present. 



Impression: Expiratory film with probable bilateral bronchitis. No evidence of 
pneumonia.

 

Electronically signed on Nov 10, 2017 10:43:23 PM CST by:

Christ DEL CID

## 2017-11-13 LAB
APPEARANCE UR: CLEAR
COLOR,URINE: YELLOW
PH UR STRIP: 5.5 [PH] (ref 5–8)
RBC UR QL: NEGATIVE
UROBILINOGEN URINE: 0.2 EU (ref 0.2–1)

## 2017-11-13 NOTE — DIAGNOSTIC IMAGING REPORT
LINO MCCOY 

[facility]

[address] [facility] [address] [address]

 

 

 

 

Report Submission Date: Nov 10, 2017 7:00:22 PM CST

Patient       Study

Name:   FILEMON WEN       Date:   Nov 10, 2017 6:47:49 PM CST

MRN:   [id number]       Modality Type:   CR

Gender:   M       Description:   CHEST

:   51       Institution:   [facility]

Physician:   LINO MCCOY

     Accession:    [id number]

 

 

Examination: PA and lateral chest. 



History: Evaluate lung fields. 



Wall comparison exam: 2017 



Findings: PA lateral chest demonstrate a normal cardiac silhouette. Tortuous 
aorta - stable the prior study. No focal infiltrate.  No blunting of the 
costophrenic margins.  Osseous structures are appropriate for age. 



Impression: No acute pulmonary process.

 

Electronically signed on Nov 10, 2017 7:00:22 PM CST by:

Abad DEL CID

## 2018-01-06 ENCOUNTER — HOSPITAL ENCOUNTER (EMERGENCY)
Dept: HOSPITAL 44 - ED | Age: 63
Discharge: HOME | End: 2018-01-06
Payer: COMMERCIAL

## 2018-01-06 VITALS — DIASTOLIC BLOOD PRESSURE: 65 MMHG | SYSTOLIC BLOOD PRESSURE: 146 MMHG

## 2018-01-06 DIAGNOSIS — R05: Primary | ICD-10-CM

## 2018-01-06 LAB
BASOPHILS NFR BLD: 1.4 % (ref 0–1.5)
EGFR (AFRICAN): > 60
EGFR (NON-AFRICAN): > 60
EOSINOPHIL NFR BLD: 13 % (ref 0–6.8)
MCH RBC QN AUTO: 30.1 PG (ref 28–34)
MCV RBC AUTO: 92 FL (ref 80–100)
MONOCYTES %: 8.3 % (ref 0–11)
NEUTROPHILS #: 2.4 # K/UL (ref 1.4–7.7)

## 2018-01-06 PROCEDURE — 99283 EMERGENCY DEPT VISIT LOW MDM: CPT

## 2018-01-06 PROCEDURE — S1016 NON-PVC INTRAVENOUS ADMINIST: HCPCS

## 2018-01-06 PROCEDURE — 71020: CPT

## 2018-01-06 PROCEDURE — 85025 COMPLETE CBC W/AUTO DIFF WBC: CPT

## 2018-01-06 PROCEDURE — 80053 COMPREHEN METABOLIC PANEL: CPT

## 2018-01-06 NOTE — DIAGNOSTIC IMAGING REPORT
LINO MCCOY 

[facility]

[address] [facility] [address] [address]

 

 

 

 

Report Submission Date: 2018 3:13:07 PM CST

Patient       Study

Name:   FILEMON WEN       Date:   2018 3:03:04 PM CST

MRN:   [id number]       Modality Type:   CR

Gender:   M       Description:   CHEST

:   51       Institution:   [facility]

Physician:   LINO MCCOY

     Accession:    [id number]

 

 

Examination: PA and lateral chest. 



History: Evaluate lung fields. 



Comparison exam: 10 November 2017 



Findings: PA lateral chest demonstrate a normal cardiac silhouette. Stable 
tortuous aorta. No focal infiltrate.  No blunting of the costophrenic margins.  
Osseous structures are appropriate for age. 



Impression: No acute pulmonary process.

 

Electronically signed on 2018 3:13:07 PM CST by:

Abad DEL CID
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
## 2018-01-22 ENCOUNTER — HOSPITAL ENCOUNTER (OUTPATIENT)
Dept: HOSPITAL 44 - ED | Age: 63
Setting detail: OBSERVATION
LOS: 2 days | Discharge: HOME | End: 2018-01-24
Attending: FAMILY MEDICINE | Admitting: FAMILY MEDICINE
Payer: COMMERCIAL

## 2018-01-22 VITALS — BODY MASS INDEX: 38.7 KG/M2

## 2018-01-22 DIAGNOSIS — T14.90XA: Primary | ICD-10-CM

## 2018-01-22 DIAGNOSIS — R10.11: ICD-10-CM

## 2018-01-22 LAB
BASOPHILS NFR BLD: 0.8 % (ref 0–1.5)
EGFR (AFRICAN): > 60
EGFR (NON-AFRICAN): > 60
EOSINOPHIL NFR BLD: 7.8 % (ref 0–6.8)
MCH RBC QN AUTO: 30.7 PG (ref 28–34)
MCV RBC AUTO: 92.4 FL (ref 80–100)
MONOCYTES %: 4.8 % (ref 0–11)
NEUTROPHILS #: 7.8 # K/UL (ref 1.4–7.7)

## 2018-01-22 PROCEDURE — 93005 ELECTROCARDIOGRAM TRACING: CPT

## 2018-01-22 PROCEDURE — 74177 CT ABD & PELVIS W/CONTRAST: CPT

## 2018-01-22 PROCEDURE — 74000: CPT

## 2018-01-22 PROCEDURE — 36415 COLL VENOUS BLD VENIPUNCTURE: CPT

## 2018-01-22 PROCEDURE — 99219: CPT

## 2018-01-22 PROCEDURE — 96375 TX/PRO/DX INJ NEW DRUG ADDON: CPT

## 2018-01-22 PROCEDURE — 99283 EMERGENCY DEPT VISIT LOW MDM: CPT

## 2018-01-22 PROCEDURE — G0378 HOSPITAL OBSERVATION PER HR: HCPCS

## 2018-01-22 PROCEDURE — 99217: CPT

## 2018-01-22 PROCEDURE — 94640 AIRWAY INHALATION TREATMENT: CPT

## 2018-01-22 PROCEDURE — 71010: CPT

## 2018-01-22 PROCEDURE — 80053 COMPREHEN METABOLIC PANEL: CPT

## 2018-01-22 PROCEDURE — 96365 THER/PROPH/DIAG IV INF INIT: CPT

## 2018-01-22 PROCEDURE — 82553 CREATINE MB FRACTION: CPT

## 2018-01-22 PROCEDURE — 99225: CPT

## 2018-01-22 PROCEDURE — 85027 COMPLETE CBC AUTOMATED: CPT

## 2018-01-22 PROCEDURE — 82550 ASSAY OF CK (CPK): CPT

## 2018-01-22 PROCEDURE — 84484 ASSAY OF TROPONIN QUANT: CPT

## 2018-01-22 PROCEDURE — 94760 N-INVAS EAR/PLS OXIMETRY 1: CPT

## 2018-01-22 PROCEDURE — 81002 URINALYSIS NONAUTO W/O SCOPE: CPT

## 2018-01-22 PROCEDURE — 83690 ASSAY OF LIPASE: CPT

## 2018-01-22 PROCEDURE — 85025 COMPLETE CBC W/AUTO DIFF WBC: CPT

## 2018-01-22 PROCEDURE — 83880 ASSAY OF NATRIURETIC PEPTIDE: CPT

## 2018-01-22 RX ADMIN — IPRATROPIUM BROMIDE AND ALBUTEROL SULFATE SCH ML: .5; 3 SOLUTION RESPIRATORY (INHALATION) at 20:26

## 2018-01-22 RX ADMIN — KETOROLAC TROMETHAMINE PRN MG: 30 INJECTION, SOLUTION INTRAMUSCULAR at 22:22

## 2018-01-22 RX ADMIN — IPRATROPIUM BROMIDE AND ALBUTEROL SULFATE SCH ML: .5; 3 SOLUTION RESPIRATORY (INHALATION) at 15:43

## 2018-01-22 RX ADMIN — KETOROLAC TROMETHAMINE ONE MG: 30 INJECTION, SOLUTION INTRAMUSCULAR at 09:26

## 2018-01-22 RX ADMIN — IPRATROPIUM BROMIDE AND ALBUTEROL SULFATE SCH: .5; 3 SOLUTION RESPIRATORY (INHALATION) at 16:21

## 2018-01-22 RX ADMIN — PANTOPRAZOLE SODIUM SCH MG: 40 TABLET, DELAYED RELEASE ORAL at 20:25

## 2018-01-22 RX ADMIN — RETINOL, ERGOCALCIFEROL, .ALPHA.-TOCOPHEROL ACETATE, DL-, PHYTONADIONE, ASCORBIC ACID, NIACINAMIDE, RIBOFLAVIN 5-PHOSPHATE SODIUM, THIAMINE HYDROCHLORIDE, PYRIDOXINE HYDROCHLORIDE, DEXPANTHENOL, BIOTIN, FOLIC ACID, AND CYANOCOBALAMIN SCH MLS/HR: KIT at 15:39

## 2018-01-22 RX ADMIN — KETOROLAC TROMETHAMINE ONE MG: 30 INJECTION, SOLUTION INTRAMUSCULAR at 15:38

## 2018-01-22 NOTE — ED PHYSICIAN DOCUMENTATION
General Adult





- HISTORIAN


Historian: patient, paramedics





- [facility]


Stated Complaint: abd pain


Chief Complaint: General Adult


Onset: hours


Timing: still present


Severity: moderate


Further Comments: yes (Pt is a 63 yo male with abd pain in the RUQ.  Pt has 

seen his pcp Hailey Lewis about this and has had recent imaging studies.  An abd u

/s on 1/19/18 showed numerous renal cysts and an echogenic liver c/w fatty 

infiltration.  Pt had had an abd CT on 1/12/18 that also showed density changes 

on the liver.  Pt had a chest CT on 1/12/18 showing calcified granulomata and a 

non-calcified 2 mm nodule with <1% chance of malignancy by criteria.  Pt 

complains that his RUQ is numb and that the pain there is stabbing.  Pt has 

unexplained bruising on his R lower flank.  He does not recall injuring himself.

)





- ROS


CONST: other (malaise)


EYES/ENT: none


CVS/RESP: none


GI/: abdominal pain, nausea





- PAST HX


Past History: hypertension, other (CAD)


Allergies/Adverse Reactions: 


 Allergies











Allergy/AdvReac Type Severity Reaction Status Date / Time


 


codeine [Codeine] Allergy Severe Anaphylaxis Verified 01/06/18 14:45














Home Medications: 


 Ambulatory Orders











 Medication  Instructions  Recorded


 


Ipratropium/Albuterol Sulfate 3 ml INH QID 01/06/18





[Duoneb]  


 


Losartan Potassium [Cozaar] 100 mg PO DAILY 01/06/18


 


Pantoprazole Sodium [Protonix] 40 mg PO BID 01/06/18


 


Metoprolol Tartrate [Lopressor] 50 mg PO DAILY 01/22/18














- SOCIAL HX


Smoking History: non-smoker





- FAMILY HX


Family History: No





- VITAL SIGNS


Vital Signs: 





 Vital Signs











Temp Pulse Resp BP Pulse Ox


 


          146/65    


 


          01/06/18 15:51   














- REVIEWED ASSESSMENTS


Nursing Assessment  Reviewed: Yes


Vitals Reviewed: Yes





Progress





- Progress


Progress: 





NS 1 L x 2





GI cocktail


Famotidine 20 mg IV





no change





NS 1 L x 2





Toradol 30 mg IV





no change





Bentyl 20 mg po





Rhabdomyolysis with AC=148.  Pt is not on meds known to cause this and has not 

been immobile.





Admit to Dr. Daniels.











- EKG/XRAY/CT


EKG: rhythm (sinus tachycardia, NK=487; LAD; incomplete LBBB.)





ED Results Lab/Radiology





- Orders


Orders: 





 ED Orders











 Category Date Time Status


 


 Continuous EKG monitoring Q30M Care  01/22/18 07:41 Active


 


 Continuous Pulse Oximetry Q30M Care  01/22/18 07:41 Active


 


 Place IV Lock 1T Care  01/22/18 07:41 Active


 


 ABDOMEN 1 VIEW [RAD] Stat Exams  01/22/18 Ordered


 


 CHEST 1 VIEW [RAD] Stat Exams  01/22/18 07:41 Ordered


 


 CBC/PLATELET/DIFF Routine Lab  01/22/18 07:50 Received


 


 CKMB Stat Lab  01/22/18 07:50 Received


 


 CMP Routine Lab  01/22/18 07:50 Received


 


 CREATINE KINASE Routine Lab  01/22/18 07:50 Received


 


 LIPASE Stat Lab  01/22/18 07:50 Received


 


 TROPONIN I (cTnI) Stat Lab  01/22/18 07:50 Received


 


 Mag Hydrox/Al Hydrox/Simeth [Mylanta] 30 ml Med  01/22/18 07:40 Discontinued





 Lidocaine 2%Visc 15ml [Xylocaine] 20 mg   





 PHENobarb/HYOSCY/ATROPINE/SCOP [Donnatal] 10 ml   





 PO NOW   


 


 Oxygen Daily Oxygen  01/22/18 07:45 Ordered


 


 EKG WITH COMPARISON Stat Ther  01/22/18 07:41 Ordered














General Adult Physical Exam





- PHYSICAL EXAM


GENERAL APPEARANCE: moderate distress


EENT: pharynx normal


NECK: normal inspection, supple


RESPIRATORY: no resp distress, chest non-tender, breath sounds normal


CVS: reg rate & rhythm, heart sounds normal


ABDOMEN: soft, decreased BS, other (tenderness RUQ)


BACK: normal inspection, no CVA tenderness


SKIN: other (ecchymosis R mid-lower abd, laterally)


EXTREMITIES: non-tender, normal range of motion, no evidence of injury


NEURO: oriented X3, motor nml, sensation nml





Discharge


Clincal Impression: 


Abdominal pain


Qualifiers:


 Abdominal location: right upper quadrant Qualified Code(s): R10.11 - Right 

upper quadrant pain





Rhabdomyolysis


Qualifiers:


 Rhabdomyolysis type: non-traumatic Qualified Code(s): M62.82 - Rhabdomyolysis





Referrals: 


Zafar Daniels MD [Primary Care Provider] - 


Condition: Stable


Disposition: 09 ADMITTED AS INPATIENT


Decision to Admit: [id number]


Decision Time: 12:53

## 2018-01-22 NOTE — DIAGNOSTIC IMAGING REPORT
GURWINDER SANABRIA 

[facility]

[address] [facility] [address] [place] [address]. [address]

 

 

 

 

Report Submission Date: 2018 8:19:07 AM CST

Patient       Study

Name:   FILEMON WEN       Date:   2018 7:56:59 AM CST

MRN:   [id number]       Modality Type:   CR

Gender:   M       Description:   ABDOMEN

:   51       Institution:   [facility]

Physician:   GURWINDER SANABRIA

     Accession:    [id number]

 

 

Examination: Obstruction series 



History: Abdominal discomfort 



Findings: 4 views obtained of the abdomen. No abnormal dilation of the large or 
small bowel. Air and stool throughout the large bowel. No suspicious 
calcification projecting over the renal fossa or the lower pelvic region. 
Pelvic phleboliths.   Osseous structures demonstrate degenerative changes. 



Impression: No obstruction. 



No suspicious calcifications by plain film sensitivity.

 

Electronically signed on 2018 8:19:07 AM CST by:

Abad DEL CID

## 2018-01-22 NOTE — DIAGNOSTIC IMAGING REPORT
GURWINDER SANABRIA 

[facility]

[address] [facility] [address] [address]

 

 

 

 

Report Submission Date: 2018 8:17:37 AM CST

Patient       Study

Name:   FILEMON WEN       Date:   2018 7:53:01 AM CST

MRN:   [id number]       Modality Type:   CR

Gender:   M       Description:   CHEST

:   51       Institution:   [facility]

Physician:   GURWINDER SANABRIA

     Accession:    [id number]

 

 

Examination: PA and lateral chest. 



History: Evaluate lung fields. 



Comparison exam: 10 November 2017 



Findings: PA lateral chest demonstrate a normal cardiac and mediastinal 
silhouette. Tortuous aorta. No focal infiltrate.  No blunting of the 
costophrenic margins.  Osseous structures are appropriate for age. 



Impression: No acute appearing pulmonary process.

 

Electronically signed on 2018 8:17:37 AM CST by:

Abad DEL CID

## 2018-01-23 LAB
APPEARANCE UR: CLEAR
BASOPHILS NFR BLD: 0.9 % (ref 0–1.5)
COLOR,URINE: (no result)
EGFR (AFRICAN): > 60
EGFR (NON-AFRICAN): > 60
EOSINOPHIL NFR BLD: 11.7 % (ref 0–6.8)
MCH RBC QN AUTO: 30.4 PG (ref 28–34)
MCV RBC AUTO: 93.8 FL (ref 80–100)
MONOCYTES %: 6.2 % (ref 0–11)
NEUTROPHILS #: 4.2 # K/UL (ref 1.4–7.7)
PH UR STRIP: 5.5 [PH] (ref 5–8)
PH UR STRIP: 6 [PH] (ref 5–8)
RBC UR QL: NEGATIVE
UROBILINOGEN URINE: 0.2 EU (ref 0.2–1)
UROBILINOGEN URINE: 0.2 EU (ref 0.2–1)

## 2018-01-23 RX ADMIN — RETINOL, ERGOCALCIFEROL, .ALPHA.-TOCOPHEROL ACETATE, DL-, PHYTONADIONE, ASCORBIC ACID, NIACINAMIDE, RIBOFLAVIN 5-PHOSPHATE SODIUM, THIAMINE HYDROCHLORIDE, PYRIDOXINE HYDROCHLORIDE, DEXPANTHENOL, BIOTIN, FOLIC ACID, AND CYANOCOBALAMIN SCH MLS/HR: KIT at 23:12

## 2018-01-23 RX ADMIN — IPRATROPIUM BROMIDE AND ALBUTEROL SULFATE SCH ML: .5; 3 SOLUTION RESPIRATORY (INHALATION) at 13:00

## 2018-01-23 RX ADMIN — METOPROLOL TARTRATE SCH MG: 50 TABLET ORAL at 08:54

## 2018-01-23 RX ADMIN — IPRATROPIUM BROMIDE AND ALBUTEROL SULFATE SCH ML: .5; 3 SOLUTION RESPIRATORY (INHALATION) at 08:45

## 2018-01-23 RX ADMIN — PANTOPRAZOLE SODIUM SCH MG: 40 TABLET, DELAYED RELEASE ORAL at 20:32

## 2018-01-23 RX ADMIN — RETINOL, ERGOCALCIFEROL, .ALPHA.-TOCOPHEROL ACETATE, DL-, PHYTONADIONE, ASCORBIC ACID, NIACINAMIDE, RIBOFLAVIN 5-PHOSPHATE SODIUM, THIAMINE HYDROCHLORIDE, PYRIDOXINE HYDROCHLORIDE, DEXPANTHENOL, BIOTIN, FOLIC ACID, AND CYANOCOBALAMIN SCH MLS/HR: KIT at 12:50

## 2018-01-23 RX ADMIN — IPRATROPIUM BROMIDE AND ALBUTEROL SULFATE SCH ML: .5; 3 SOLUTION RESPIRATORY (INHALATION) at 21:13

## 2018-01-23 RX ADMIN — KETOROLAC TROMETHAMINE PRN MG: 30 INJECTION, SOLUTION INTRAMUSCULAR at 08:52

## 2018-01-23 RX ADMIN — RETINOL, ERGOCALCIFEROL, .ALPHA.-TOCOPHEROL ACETATE, DL-, PHYTONADIONE, ASCORBIC ACID, NIACINAMIDE, RIBOFLAVIN 5-PHOSPHATE SODIUM, THIAMINE HYDROCHLORIDE, PYRIDOXINE HYDROCHLORIDE, DEXPANTHENOL, BIOTIN, FOLIC ACID, AND CYANOCOBALAMIN SCH: KIT at 10:46

## 2018-01-23 RX ADMIN — KETOROLAC TROMETHAMINE PRN MG: 30 INJECTION, SOLUTION INTRAMUSCULAR at 20:37

## 2018-01-23 RX ADMIN — RETINOL, ERGOCALCIFEROL, .ALPHA.-TOCOPHEROL ACETATE, DL-, PHYTONADIONE, ASCORBIC ACID, NIACINAMIDE, RIBOFLAVIN 5-PHOSPHATE SODIUM, THIAMINE HYDROCHLORIDE, PYRIDOXINE HYDROCHLORIDE, DEXPANTHENOL, BIOTIN, FOLIC ACID, AND CYANOCOBALAMIN SCH MLS/HR: KIT at 01:09

## 2018-01-23 RX ADMIN — PANTOPRAZOLE SODIUM SCH MG: 40 TABLET, DELAYED RELEASE ORAL at 08:54

## 2018-01-23 RX ADMIN — IPRATROPIUM BROMIDE AND ALBUTEROL SULFATE SCH ML: .5; 3 SOLUTION RESPIRATORY (INHALATION) at 16:10

## 2018-01-23 NOTE — HISTORY AND PHYSICAL REPORT
CHIEF COMPLAINT: 

Right upper quadrant pain. 



HISTORY OF PRESENT ILLNESS: 

This is a 62-year-old male well known to myself who actually has been seeing 
Hailey Lewis here in town because of increasing right upper quadrant pain over 
the last week.  He has had multiple CTs which have shown some fatty 
infiltration of his liver, as well as liver cysts and some calcified pulmonary 
nodules but no clear etiology for his right upper quadrant pain.  He has not 
had a HIDA scan.   He states that his pain has been present for a week but 
getting progressively worse.  He also has some right abdominal wall numbness 
associated with this.  He has not had any fever, chills, nausea, or vomiting 
but generally just feels very weak and the pain is significant.  



PAST MEDICAL HISTORY: 

1.   History of morbid obesity.

2.   Hypertension. 

3.   Hyperlipidemia. 

4.   He is a recovered alcoholic.  He has not drank for 20 years. 

5.   History of explosive personality disorder. 

6.   Environmental allergies. 

7.   An esophageal stricture. 

8.   Seborrhoic keratosis.



PAST SURGICAL HISTORY: 

He has had no surgeries. 



MEDICATIONS: 

1.   Losartan 100 mg p.o. daily. 

2.   Dicyclomine 20 mg p.o. daily. 

3.   Metoprolol tartrate 50 mg p.o. daily. 

4.   Pantoprazole 40 mg daily. 

5.   Tramadol 50 mg p.o. every 6 hours. 



ALLERGIES: 

Codeine. 



SOCIAL HISTORY: 

He lives alone.  He has never .   Nonsmoker.   No children.   No 
alcohol.  Moderate caffeine intake.  He has worked as a  at [employer] 
[place] in the past. 



FAMILY HISTORY: 

Mother  at age 70 from coronary vascular disease.  Father  at age 83 
from coronary vascular disease and alcoholism.  He has a brother who is an 
active alcoholic.  Sister has hypertension. 



REVIEW OF SYSTEMS: 

He has not had any bowel movements recently, although he denies he is 
significantly constipated.  He has not had any fever, chills, nausea, or 
vomiting.  In fact his vital signs here show him to be normotensive and 
afebrile. 



PHYSICAL EXAMINATION: 

General:   This is a very pleasant bearded morbidly obese 62-year-old male with 
significant central obesity. 

HEENT:   Shows his head to be normocephalic and atraumatic.  His mucous 
membranes are a little bit dry.   Dentition are in poor repair.  

Neck:  No carotid bruits.  No thyroid masses.  

Lungs:   His lungs show some expiratory wheezes in all lung fields.  He has a 
wet productive cough.  

Heart:    Regular.   No murmurs. 

Abdomen:  Soft.  A remarkable large ecchymosis is noted on his right lower and 
mid-abdomen.  He states he was completely unaware of this until it was brought 
to his attention by Dr. Briones in the emergency room.   He has significant 
right upper quadrant tenderness.   No scars are noted to suggest a previous 
cholecystectomy.  He has no guarding or rebound.  

Extremities:   Warm and well perfused.  There is about 1+ edema in bilateral 
lower extremities. 

Easily palpable dorsalis pedis and posterior tibial pulses are noted.  

Neurologic:  His mental status shows him to be oriented to place, person, and 
time. 



LABORATORY: 

Shows a white count of 10,400, hemoglobin 14.7, hematocrit 44.3, platelets 265,
000.   Chemistry panel shows creatine kinase of 874.  Troponin is negative.  
Glucose 154.  No significant elevation in LFTs.  Lipase is 66.



ASSESSMENT: 

Right upper quadrant pain.  Certainly let us entertain the possibility of his 
gallbladder as a possibility for this. 



PLAN: 

1.    We are going to treat his pain tonight. 

2.    May need to make arrangements for him to have a HIDA scan with CCK 
stimulation.

3.    We will attempt to read his films that he brought in with him, as none of 
our computers here on [facility] will read them because their operating system 
has not been updated for a long time.
[id number]

## 2018-01-23 NOTE — INPATIENT PROGRESS NOTE
Subjective





- Required Recertification Statement


I anticipate X number of days because-include discharge plan: 1





- Review of Systems


Events since last encounter: 





Hari did get some relief from his right upper quadrant pain with the demerol 

and toradol.   His right sided abdominal wall bruise is a little worse this 

morning.   I called [facility], and although Hari thought that he had 

a CT of his abdomen there, they told me that he has only had a complete 

abdominal u/s and CXR.  He is still coughing quite a bit.  


General: Denies: Chills


HEENT: Denies: Head Aches, Visual Changes


Pulmonary: Dyspnea, Cough


Cardiovascular: Denies: Chest Pain


Gastrointestinal: Nausea, Abdominal Pain (right upper quadrant).  Denies: 

Vomiting


Genitourinary: Denies: Dysuria, Frequency


Musculoskeletal: Denies: Neck Pain


Neurological: Denies: Weakness, Numbness





Objective





- Exam


Vitals and I&O: 


 Vital Signs











Temp  96.4 F L  01/23/18 06:00


 


Pulse  118 H  01/23/18 08:57


 


Resp  22   01/23/18 08:00


 


BP  132/76   01/23/18 06:00


 


Pulse Ox  90 L  01/23/18 06:00











 Intake & Output











 01/22/18 01/22/18 01/23/18





 11:59 23:59 11:59


 


Intake Total  720 2300


 


Balance  720 2300


 


Weight  122.47 kg 


 


Intake:   


 


  IV   1100


 


    Right Hand   1100


 


  Oral  720 1200


 


Other:   


 


  Voiding Method   Toilet


 


  # Voids  3 2














General: Alert, Oriented to Person, Oriented to Place, Oriented to Time, Mild 

distress


HEENT: Atraumatic, PERRLA


Neck: Supple, No JVD


Lungs: Wheezes


Cardiovascular: Regular rate


Abdomen: Normal bowel sounds, Other (Large right abdominal wall ecchymosis and 

tenderness)


Extremities: No clubbing, No cyanosis, No edema


Skin: Normal, Pink


Neurological: Normal speech





- Results


Results: 


 Laboratory Results











WBC  6.30 K/ul (4.00-12.00)   01/23/18  06:00    


 


RBC  4.16 M/ul (3.90-5.20)   01/23/18  06:00    


 


Hgb  12.6 g/dL (12.0-18.0)   01/23/18  06:00    


 


Hct  39.0 % (37.0-53.0)   01/23/18  06:00    


 


MCV  93.8 fl (80.0-100.0)   01/23/18  06:00    


 


MCH  30.4 pg (28.0-34.0)   01/23/18  06:00    


 


MCHC  32.4 g/dL (30.0-36.0)   01/23/18  06:00    


 


RDW  13.0 % (11.3-14.3)   01/23/18  06:00    


 


Plt Count  208 K/mm3 (130-400)   01/23/18  06:00    


 


Neut % (Auto)  65.7 % (39.0-79.0)   01/23/18  06:00    


 


Lymph % (Auto)  12.1 % (16.0-50.0)  L  01/23/18  06:00    


 


Mono % (Auto)  6.2 % (0.0-11.0)   01/23/18  06:00    


 


Eos % (Auto)  11.7 % (0.0-6.8)  H  01/23/18  06:00    


 


Baso % (Auto)  0.9  (0.0-1.5)   01/23/18  06:00    


 


Neut # (Auto)  4.2 # k/uL (1.4-7.7)   01/23/18  06:00    


 


Lymph # (Auto)  0.8 # k/uL (0.6-4.0)   01/23/18  06:00    


 


Mono # (Auto)  0.4 # k/uL (0.0-0.9)   01/23/18  06:00    


 


Eos # (Auto)  0.7 # k/uL (0.0-0.6)  H  01/23/18  06:00    


 


Baso # (Auto)  0.1 # k/uL (0.0-0.5)   01/23/18  06:00    


 


Reactive Lymphs %  3.4 % (0.0-5.0)   01/23/18  06:00    


 


Reactive Lymphs #  0.2 # k/uL (0.0-0.8)   01/23/18  06:00    


 


Sodium  139 mmol/L (136-145)   01/23/18  06:00    


 


Potassium  4.4 mmol/L (3.5-5.1)   01/23/18  06:00    


 


Chloride  101 mmol/L ()   01/23/18  06:00    


 


Carbon Dioxide  28 mmol/L (22-30)   01/23/18  06:00    


 


BUN  19 mg/dL (9-20)   01/23/18  06:00    


 


Creatinine  0.90 mg/dL (0.66-1.25)   01/23/18  06:00    


 


Estimated Creat Clear  143   01/23/18  06:00    


 


Est GFR ( Amer)  > 60  (60-)  01/23/18  06:00    


 


Est GFR (Non-Af Amer)  > 60  (60-)  01/23/18  06:00    


 


Glucose  99 mg/dL ()   01/23/18  06:00    


 


Calcium  8.6 mg/dL (8.4-10.2)   01/23/18  06:00    


 


Total Bilirubin  0.9 mg/dL (0.2-1.3)   01/23/18  06:00    


 


AST  36 U/L (15-46)   01/23/18  06:00    


 


ALT  36 U/L (13-69)   01/23/18  06:00    


 


Alkaline Phosphatase  49 U/L ()   01/23/18  06:00    


 


Creatine Kinase  863 U/L ()  H  01/23/18  06:00    


 


CK-MB (CK-2)  5.2 ng/mL (0.0-5.6)   01/22/18  07:50    


 


Troponin I  < 0.03 ng/mL (0.03-0.06)  L  01/22/18  07:50    


 


NT-Pro-B Natriuret Pep  34.5 pg/mL (15.0-125.0)   01/22/18  07:50    


 


Total Protein  7.2 g/dL (6.3-8.2)   01/22/18  07:50    


 


Albumin  4.0 g/dL (3.5-5.0)   01/22/18  07:50    


 


Lipase  66 U/L ()   01/22/18  07:50    


 


Urine Color  Rosita  (YELLOW)   01/22/18  12:32    


 


Urine Appearance  Clear  (CLEAR)   01/22/18  12:32    


 


Urine pH  6.0  (5.0 - 8.0)   01/22/18  12:32    


 


Ur Specific Gravity  1.025  (1.010-1.030)   01/22/18  12:32    


 


Urine Protein  Trace mg/dL (NEGATIVE)   01/22/18  12:32    


 


Urine Ketones  Negative mg/dL (NEGATIVE)   01/22/18  12:32    


 


Urine Occult Blood  Negative  (NEGATIVE)   01/22/18  12:32    


 


Urine Nitrite  Negative  (NEGATIVE)   01/22/18  12:32    


 


Urine Bilirubin  Negative  (NEGATIVE)   01/22/18  12:32    


 


Urine Urobilinogen  0.2 Eu (0.2-1.0)   01/22/18  12:32    


 


Ur Leukocyte Esterase  Negative  (NEGATIVE)   01/22/18  12:32    


 


Urine Glucose  Negative mg/dL (NEGATIVE)   01/22/18  12:32    

















Assessment/Plan





- Assessment/Plan


(1) Abdominal pain


Status: Acute   Current Visit: Yes   


Qualifiers: 


   Abdominal location: right upper quadrant   Qualified Code(s): R10.11 - Right 

upper quadrant pain   


Assessment: 


Will proceed with CT abdomen and pelvis with contrast


HIDA scan likely will be needed to confirm whether or not GB pathology is a 

factor in his pain








(2) Rhabdomyolysis


Status: Acute   Current Visit: Yes   


Qualifiers: 


   Rhabdomyolysis type: non-traumatic   Qualified Code(s): M62.82 - 

Rhabdomyolysis   


Assessment: 


With CK still elevated, renal labs stable








(3) Asthmatic bronchitis


Status: Acute   Current Visit: No   


Qualifiers: 


   Asthma severity: moderate   Asthma persistence: persistent   Asthma 

complication type: with acute exacerbation   Qualified Code(s): J45.41 - 

Moderate persistent asthma with (acute) exacerbation   


Assessment: 


Chronic

## 2018-01-24 ENCOUNTER — HOSPITAL ENCOUNTER (EMERGENCY)
Dept: HOSPITAL 44 - ED | Age: 63
Discharge: HOME | End: 2018-01-24
Payer: COMMERCIAL

## 2018-01-24 VITALS
SYSTOLIC BLOOD PRESSURE: 129 MMHG | SYSTOLIC BLOOD PRESSURE: 129 MMHG | DIASTOLIC BLOOD PRESSURE: 73 MMHG | SYSTOLIC BLOOD PRESSURE: 129 MMHG | SYSTOLIC BLOOD PRESSURE: 129 MMHG | SYSTOLIC BLOOD PRESSURE: 129 MMHG | DIASTOLIC BLOOD PRESSURE: 73 MMHG | DIASTOLIC BLOOD PRESSURE: 73 MMHG | DIASTOLIC BLOOD PRESSURE: 73 MMHG | DIASTOLIC BLOOD PRESSURE: 73 MMHG | SYSTOLIC BLOOD PRESSURE: 129 MMHG | DIASTOLIC BLOOD PRESSURE: 73 MMHG | SYSTOLIC BLOOD PRESSURE: 129 MMHG | DIASTOLIC BLOOD PRESSURE: 73 MMHG

## 2018-01-24 VITALS — DIASTOLIC BLOOD PRESSURE: 85 MMHG | SYSTOLIC BLOOD PRESSURE: 153 MMHG

## 2018-01-24 DIAGNOSIS — I10: ICD-10-CM

## 2018-01-24 DIAGNOSIS — E78.5: ICD-10-CM

## 2018-01-24 DIAGNOSIS — J42: Primary | ICD-10-CM

## 2018-01-24 LAB
BASOPHILS NFR BLD: 0.8 % (ref 0–1.5)
EGFR (AFRICAN): > 60
EGFR (AFRICAN): > 60
EGFR (NON-AFRICAN): > 60
EGFR (NON-AFRICAN): > 60
EOSINOPHIL NFR BLD: 13.4 % (ref 0–6.8)
MCH RBC QN AUTO: 30.5 PG (ref 28–34)
MCH RBC QN AUTO: 30.8 PG (ref 28–34)
MCV RBC AUTO: 94.3 FL (ref 80–100)
MCV RBC AUTO: 95.7 FL (ref 80–100)
MONOCYTES %: 4.5 % (ref 0–11)
NEUTROPHILS #: 4.7 # K/UL (ref 1.4–7.7)

## 2018-01-24 PROCEDURE — 96372 THER/PROPH/DIAG INJ SC/IM: CPT

## 2018-01-24 PROCEDURE — 71010: CPT

## 2018-01-24 PROCEDURE — 99283 EMERGENCY DEPT VISIT LOW MDM: CPT

## 2018-01-24 PROCEDURE — 94640 AIRWAY INHALATION TREATMENT: CPT

## 2018-01-24 PROCEDURE — S1016 NON-PVC INTRAVENOUS ADMINIST: HCPCS

## 2018-01-24 PROCEDURE — 85025 COMPLETE CBC W/AUTO DIFF WBC: CPT

## 2018-01-24 PROCEDURE — 80053 COMPREHEN METABOLIC PANEL: CPT

## 2018-01-24 RX ADMIN — IPRATROPIUM BROMIDE AND ALBUTEROL SULFATE ONE: .5; 3 SOLUTION RESPIRATORY (INHALATION) at 13:15

## 2018-01-24 RX ADMIN — RETINOL, ERGOCALCIFEROL, .ALPHA.-TOCOPHEROL ACETATE, DL-, PHYTONADIONE, ASCORBIC ACID, NIACINAMIDE, RIBOFLAVIN 5-PHOSPHATE SODIUM, THIAMINE HYDROCHLORIDE, PYRIDOXINE HYDROCHLORIDE, DEXPANTHENOL, BIOTIN, FOLIC ACID, AND CYANOCOBALAMIN SCH: KIT at 04:50

## 2018-01-24 RX ADMIN — PANTOPRAZOLE SODIUM SCH MG: 40 TABLET, DELAYED RELEASE ORAL at 08:54

## 2018-01-24 RX ADMIN — METOPROLOL TARTRATE SCH MG: 50 TABLET ORAL at 08:54

## 2018-01-24 RX ADMIN — IPRATROPIUM BROMIDE AND ALBUTEROL SULFATE SCH: .5; 3 SOLUTION RESPIRATORY (INHALATION) at 11:21

## 2018-01-24 RX ADMIN — IPRATROPIUM BROMIDE AND ALBUTEROL SULFATE ONE ML: .5; 3 SOLUTION RESPIRATORY (INHALATION) at 11:40

## 2018-01-24 RX ADMIN — AZITHROMYCIN ONE MG: 250 TABLET, FILM COATED ORAL at 14:19

## 2018-01-24 RX ADMIN — KETOROLAC TROMETHAMINE PRN MG: 30 INJECTION, SOLUTION INTRAMUSCULAR at 02:37

## 2018-01-24 RX ADMIN — IPRATROPIUM BROMIDE AND ALBUTEROL SULFATE ONE ML: .5; 3 SOLUTION RESPIRATORY (INHALATION) at 13:13

## 2018-01-24 NOTE — DIAGNOSTIC IMAGING REPORT
GURWINDER SANABRIA 

[facility]

[address] [facility] [address] [address]

 

 

 

 

Report Submission Date: 2018 12:12:37 PM CST

Patient       Study

Name:   FILEMON WEN       Date:   2018 12:02:08 PM CST

MRN:   [id number]       Modality Type:   CR

Gender:   M       Description:   CHEST

:   51       Institution:   [facility]

Physician:   GURWINDER SANABRIA

     Accession:    [id number]

 

 

Examination: Portable chest 



History: Evaluate lungs 



Comparison exam: 2018 



Findings: PA lateral chest demonstrate a mildly hypoventilated inspiratory 
effort resulting in crowding of the cardiac and mediastinal silhouette. 
Tortuous aorta. No focal infiltrate.  No blunting of the costophrenic margins.  
Osseous structures are appropriate for age. 



Impression: No acute appearing pulmonary process.

 

Electronically signed on 2018 12:12:37 PM CST by:

Abad DEL CID

## 2018-01-24 NOTE — DIAGNOSTIC IMAGING REPORT
[facility] 

[facility]

[address] [facility] [address]

[place], [place]. [address]

 

 

 

 

Report Submission Date: 2018 12:22:46 PM CST

Patient       Study

Name:   FILEMON WEN       Date:   2018 11:42:05 AM CST

MRN:   [id number]       Modality Type:   CT\SR

Gender:   M       Description:   CT ABD & PELVIS W/ CON

:   51       Institution:   [facility]

Physician:   [facility]

     Accession:    [id number]

 

 

Examination: CT Abdomen/pelvis 



History: Generalized abdominal discomfort 



Comparison exams: Plain film dated 2018 



Technique: CT Abdomen/pelvis with IV protocol.  



Findings:  Liver demonstrates diffuse low attenuation. No central lesion. Spleen
, adrenals, pancreas, and gallbladder are without gross irregularity.  No 
abnormal enhancement. No gallstone. Numerous renal cortical  cysts.  No 
suspicious cortical or calyceal calcifications. Ureters are nondilated in their 
course through the abdomen and pelvis. No central calcification. No bladder 
margin without abnormality. Abdominal aorta demonstrates peripheral 
atherosclerotic disease. No aneurysm. Cardiac silhouette not enlarged. No 
pericardial effusion. 



Bowel unopacified limiting evaluation. No abnormal small bowel small bowel 
dilation. Stool within the large bowel limiting sensitivity. No mesenteric 
inflammatory changes or free fluid. Appendix is visualized and is without 
inflammatory changes. Few scattered sigmoidal diverticula. No adjacent 
inflammation. 



Osseous structures demonstrate degenerative changes. Lung bases demonstrates 
right base consolidation with mild pleural thickening/effusion. 



Impression: No evidence for acute upper abdominal organ inflammatory process. 



Fatty liver. 



No gallstone. 



Bilateral renal cysts. No evidence for suspicious renal calcification or 
abnormal ureteric dilation. 



No abnormal bowel dilation or inflammation. 



Sigmoid diverticulosis. No evidence for acute diverticulitis. 



Right lung base consolidation/effusion.

 

Electronically signed on 2018 12:22:46 PM CST by:

Abad Vasquez

Right lateral soft tissue edema. No formed fluid collection to suggest hematoma 
or abscess.

Addendum electronically signed by Abad Vasquez on 2018 7:04:34 PM CST
MTDD

## 2018-01-24 NOTE — DISCHARGE SUMMARY
DATE OF ADMISSION: 

January 22, 2018



DATE OF DISCHARGE: 

January 24, 2018

 

DIAGNOSES ON THIS HOSPITALIZATION: 

1.   Right upper quadrant pain. 

2.   Abdominal wall hematoma. 

3.   Obesity. 



SUMMARIZATION OF ADMISSION HISTORY AND PHYSICAL: 

This is a 62-year-old male well known to myself who actually has been seeing 
Hailey Lewis in [facility] because of increasing right upper quadrant pain over the 
last week.  He had several studies; however, about a day before he came in and 
after he had those previous studies, he experienced a large spontaneous bruise 
on his right abdomen which was moderately uncomfortable for him.  As a result, 
we did a CT scan which showed an abdominal wall bruise basically.  It did not 
look like a hematoma or an abscess.   Also, he was noted to have very large 
renal cysts and a right lower lobe effusion.  He did not demonstrate any 
gallbladder stones or gallbladder wall thickening.  



As a result, we were able to control his pain using Demerol and he is sent home 
with a prescription for that.  We will see him back in the office next week.  A 
HIDA scan will be performed in the next couple of days at [facility].  
We are working on scheduling that at this time.  He is to call or return for 
increasing pain.   The plan is discussed with the patient prior to his 
discharge today.  



CONDITION ON DISCHARGE: 

He is discharged to home in improved condition. 

MARIA EUGENIA

## 2018-01-24 NOTE — ED PHYSICIAN DOCUMENTATION
General Adult





- HISTORIAN


Historian: patient





- HPI


Stated Complaint: sob


Chief Complaint: General Adult


Onset: days ago


Timing: still present


Severity: moderate


Further Comments: yes (Pt is a 61 yo male with sob, wheezing, that came on as 

he was waiting for ride home after being d/c'd from [facility] this am.  Pt was 

admitted for abd pain and had an abd CT here 24 hrs ago that showed R lung base 

consolidation/effusion in addition to fatty liver and diverticulosis.  Pt is 

suspected of having gall bladder dz and is awaiting a HIDA scan.  Pt has a hx 

chronic bronchitis and had an SpO2=87% on arrival in ER.)





- ROS


CONST: weakness


CVS/RESP: shortness of breath, cough, other (wheezing)


GI/: abdominal pain


MS/SKIN/LYMPH: ankle swelling





- PAST HX


Past History: other (chronic bronchitis; HTN; HLD; esophageal stricture; hx 

alcoholism (20 yrs sober).)


Allergies/Adverse Reactions: 


 Allergies











Allergy/AdvReac Type Severity Reaction Status Date / Time


 


codeine [Codeine] Allergy Severe Anaphylaxis Verified 01/06/18 14:45














Home Medications: 


 Ambulatory Orders











 Medication  Instructions  Recorded


 


Ipratropium/Albuterol Sulfate 3 ml INH QID 01/06/18





[Duoneb]  


 


Losartan Potassium [Cozaar] 100 mg PO DAILY 01/06/18


 


Pantoprazole Sodium [Protonix] 40 mg PO BID 01/06/18


 


Metoprolol Tartrate [Lopressor] 50 mg PO DAILY 01/22/18














- SOCIAL HX


Smoking History: non-smoker


Alcohol Use: none


Drug Use: none





- FAMILY HX


Family History: Yes (Mother: CAD; Father: CAD, alcoholism; Brother: alcoholism; 

Sister: HTN)





- VITAL SIGNS


Vital Signs: 





 Vital Signs











Temp Pulse Resp BP Pulse Ox


 


          153/85    


 


          01/24/18 06:00   














- REVIEWED ASSESSMENTS


Nursing Assessment  Reviewed: Yes


Vitals Reviewed: Yes





Progress





- Progress


Progress: 





CXR: No acute appearing pulmonary process.





(CT abd yesterday showed R lung base consolidation/effusion in addition to 

fatty liver and diverticulosis.)





Duoneb HFN





improved





Duoneb HFN





Depo-Medrol 80 mg IM in ER





improved





Pt desires d/c home from ER.





Pt has HIDA scan scheduled for Mon 1-29-18.











D/c instructions:





Z-loyd.  Use as directed on package.





Prednisone 10 mg.  Take 6 tablets at the same time each day for 3 days; then 4 

tablets once daily for 3 days; then 2 tablets daily once daily for 3 days; then 

1 tablet once daily for 3 days; then stop.





Follow up with Dr. Otto later this week.  Call for an appointment on 

Friday.





General Adult Physical Exam





- PHYSICAL EXAM


GENERAL APPEARANCE: moderate distress


EENT: pharynx normal


NECK: normal inspection, supple


RESPIRATORY: wheezes, rhonchi


CVS: reg rate & rhythm, heart sounds normal


ABDOMEN: soft, no organomegaly, normal bowel sounds, tenderness (mild RUQ)


BACK: normal inspection


SKIN: warm/dry, normal color, other (ecchymosis R lower abd wall)


EXTREMITIES: non-tender, normal range of motion, edema (1+)


NEURO: oriented X3, motor nml, sensation nml





Discharge


Clincal Impression: 


 chronic bronchitis, possible gall bladder dz





Referrals: 


Guillermo Otto MD [STAFF PHYSICIAN] - 2 Days


Condition: Stable


Disposition: 01 HOME, SELF-CARE


Decision to Admit: NO


Decision Time: 14:33

## 2018-01-26 ENCOUNTER — HOSPITAL ENCOUNTER (EMERGENCY)
Dept: HOSPITAL 44 - ED | Age: 63
Discharge: HOME | End: 2018-01-26
Payer: COMMERCIAL

## 2018-01-26 DIAGNOSIS — K44.9: Primary | ICD-10-CM

## 2018-01-26 PROCEDURE — 99283 EMERGENCY DEPT VISIT LOW MDM: CPT

## 2018-01-26 PROCEDURE — 71020: CPT

## 2018-01-26 NOTE — ED PHYSICIAN DOCUMENTATION
General Adult





- HISTORIAN


Historian: patient





- HPI


Stated Complaint: feels like his pills are stuck


Chief Complaint: General Adult


Onset: other (this am's pills )


Timing: still present


Severity: mild


Further Comments: yes (reports he has a hiatal hernia and he feels his pills 

are stuck. He denies any shortness of breath. He recently had a issue with 

influenza and he was in the hosptial. He states overall he feels good He states 

that he took a "horse pill")


Last known Well Code/Unknown Code: Unknown





- ROS


CONST: no problems





- PAST HX


Past History: hypertension


Surgeries/Procedures: other


Immunizations: referred to PCP


Allergies/Adverse Reactions: 


 Allergies











Allergy/AdvReac Type Severity Reaction Status Date / Time


 


codeine [Codeine] Allergy Severe Anaphylaxis Verified 01/26/18 05:39














Home Medications: 


 Ambulatory Orders











 Medication  Instructions  Recorded


 


Ipratropium/Albuterol Sulfate 3 ml INH QID 01/06/18





[Duoneb]  


 


Losartan Potassium [Cozaar] 100 mg PO DAILY 01/06/18


 


Pantoprazole Sodium [Protonix] 40 mg PO BID 01/06/18


 


Metoprolol Tartrate [Lopressor] 50 mg PO DAILY 01/22/18














- SOCIAL HX


Smoking History: non-smoker


Alcohol Use: none


Drug Use: none





- FAMILY HX


Family History: No





- VITAL SIGNS


Vital Signs: 





 Vital Signs











Temp Pulse Resp BP Pulse Ox


 


          129/73    


 


          01/24/18 11:38   














- REVIEWED ASSESSMENTS


Nursing Assessment  Reviewed: Yes


Vitals Reviewed: Yes





ED Results Lab/Radiology





- Radiology


Radiology Impressions: 


Examination: PA and lateral chest. 





History: Evaluate lung fields. 





Comparison exam: 24 January 2018 





Findings: PA lateral chest demonstrate a prominent cardiac and mediastinal 

silhouette. No focal infiltrate. No blunting of the costophrenic margins. 

Osseous structures are appropriate for age. 





Impression: No acute appearing pulmonary process. 


Electronically signed on Jan 26, 2018 6:10:23 AM CST by: 


Abad Vasquez








General Adult Physical Exam





- PHYSICAL EXAM


GENERAL APPEARANCE: no distress


EENT: eye inspection normal, pharynx normal


NECK: normal inspection


RESPIRATORY: no resp distress, chest non-tender, breath sounds normal


CVS: reg rate & rhythm, heart sounds normal, equal pulses, no murmur


ABDOMEN: soft, normal bowel sounds, no distension, non-tender


SKIN: other (large bruise on lateral side of right chest )


EXTREMITIES: non-tender


NEURO: oriented X3, CN's nml as tested, motor nml, sensation nml





Discharge


Clincal Impression: 


 Hiatal hernia





Referrals: 


Zafar Daniels MD [Primary Care Provider] - 2 Days


Additional Instructions: 


Continue to drink and eat small amounts


Stay sitting up after eating 


Return to ER for any concerns 


Condition: Stable


Disposition: 01 HOME, SELF-CARE


Decision to Admit: NO


Date of Decison to Admit: 01/26/18


Decision Time: 06:18

## 2018-01-26 NOTE — DIAGNOSTIC IMAGING REPORT
JAVIER COLINDRES 

[facility]

[address] [facility] [address]

[place], [place]. [address]

 

 

 

 

Report Submission Date: 2018 6:10:23 AM CST

Patient       Study

Name:   FILEMON WEN       Date:   2018 5:55:49 AM CST

MRN:   [id number]       Modality Type:   CR

Gender:   M       Description:   CHEST

:   51       Institution:   [facility]

Physician:   JAVIER COLINDRES

     Accession:    [id number]

 

 

Examination: PA and lateral chest. 



History: Evaluate lung fields. 



Comparison exam: 2018 



Findings: PA lateral chest demonstrate a prominent cardiac and mediastinal 
silhouette. No focal infiltrate.  No blunting of the costophrenic margins.  
Osseous structures are appropriate for age. 



Impression: No acute appearing pulmonary process.

 

Electronically signed on 2018 6:10:23 AM CST by:

Abad DEL CID

## 2018-02-08 ENCOUNTER — HOSPITAL ENCOUNTER (OUTPATIENT)
Dept: HOSPITAL 44 - RAD | Age: 63
End: 2018-02-08
Attending: NURSE PRACTITIONER
Payer: COMMERCIAL

## 2018-02-08 DIAGNOSIS — R05: Primary | ICD-10-CM

## 2018-02-08 PROCEDURE — 71020: CPT

## 2018-02-08 NOTE — DIAGNOSTIC IMAGING REPORT
ADAN THAKUR (NP) - OP 

[facility]

[address] [facility] [address]

 

 

 

 

Report Submission Date: 2018 11:49:05 AM CST

Patient       Study

Name:   FILEMON WEN       Date:   2018 11:29:57 AM CST

MRN:   [id number]       Modality Type:   CR

Gender:   M       Description:   CHEST

:   51       Institution:   [facility]

Physician:   ADAN THAKUR (DERRELL) - OP

     Accession:    [id number]

 

 

Examination: PA and lateral chest. 



History: CXR, CONTINUED COUGH X3 WEEKS, RT SIDED CHEST SORENESS, NO INJURY (Hx) 
/ COUGH (DICOM Hx) / COUGH (Pt comments) 



Comparison exam:  genu 2018. 



Findings: PA lateral chest demonstrate a decreased inspiratory effort resulting 
in crowding of the cardiac and mediastinal silhouette. Mildly tortuous aorta. 
Vascular crowding at the right lung base.  No focal infiltrate.  No blunting of 
the left costophrenic margin. Mild right lateral wall pleural thickening. 
Cortical irregularity involving a lower right lateral rib - likely 7th rib. 



Impression: Right basilar vascular crowding due to poor inspiratory effort. 



Right lower rib cortical irregularity associated with adjacent pleural 
thickening - recommend obtaining dedicated rib series to further evaluate.

 

Electronically signed on 2018 11:49:05 AM CST by:

Abad DEL CID

## 2018-03-01 ENCOUNTER — HOSPITAL ENCOUNTER (OUTPATIENT)
Dept: HOSPITAL 44 - RAD | Age: 63
End: 2018-03-01
Attending: NURSE PRACTITIONER
Payer: COMMERCIAL

## 2018-03-01 DIAGNOSIS — R07.81: Primary | ICD-10-CM

## 2018-03-01 PROCEDURE — 71101 X-RAY EXAM UNILAT RIBS/CHEST: CPT

## 2018-03-01 NOTE — DIAGNOSTIC IMAGING REPORT
ADAN THAKUR (NP) - OP 

[facility]

[address] [address] [address]

 

 

 

 

Report Submission Date: Mar 1, 2018 11:43:17 AM CST

Patient       Study

Name:   FILEMON WEN       Date:   Mar 1, 2018 11:05:00 AM CST

MRN:   [id number]       Modality Type:   DX

Gender:   M       Description:   CHEST

:   51       Institution:   [facility]

Physician:   ADAN THAKUR (DERRELL) - OP

     Accession:    [id number]

 

 

Examination: Plain film right ribs 



History: RT SIDE RIB PAIN (Hx) 



Findings: 4 views of the right ribs demonstrates 7th rib cortical angulation. 
Remaining rib margins are without irregularity. Significant right lateral 
pleural thickening. Blunting of the right costophrenic margin. 



Impression: Right 7th rib cortical irregularity. Right base pleural infiltrate/
effusion. Given the progression/persistence of symptoms, recommend CT chest to 
further evaluate.

 

Electronically signed on Mar 1, 2018 11:43:17 AM CST by:

Abad DEL CID

## 2018-05-04 ENCOUNTER — HOSPITAL ENCOUNTER (OUTPATIENT)
Dept: HOSPITAL 44 - LAB | Age: 63
End: 2018-05-04
Attending: PHYSICIAN ASSISTANT
Payer: COMMERCIAL

## 2018-05-04 DIAGNOSIS — S30.860A: Primary | ICD-10-CM
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
## 2018-05-04 PROCEDURE — 36415 COLL VENOUS BLD VENIPUNCTURE: CPT

## 2018-05-04 PROCEDURE — 86757 RICKETTSIA ANTIBODY: CPT

## 2018-05-04 PROCEDURE — 86618 LYME DISEASE ANTIBODY: CPT

## 2018-05-04 PROCEDURE — 86666 EHRLICHIA ANTIBODY: CPT

## 2018-07-18 ENCOUNTER — HOSPITAL ENCOUNTER (OUTPATIENT)
Dept: HOSPITAL 44 - LAB | Age: 63
End: 2018-07-18
Attending: FAMILY MEDICINE
Payer: COMMERCIAL

## 2018-07-18 DIAGNOSIS — N40.1: Primary | ICD-10-CM

## 2018-07-18 PROCEDURE — 36415 COLL VENOUS BLD VENIPUNCTURE: CPT

## 2018-07-18 PROCEDURE — 84153 ASSAY OF PSA TOTAL: CPT

## 2018-08-27 ENCOUNTER — HOSPITAL ENCOUNTER (EMERGENCY)
Dept: HOSPITAL 44 - ED | Age: 63
Discharge: HOME | End: 2018-08-27
Payer: COMMERCIAL

## 2018-08-27 ENCOUNTER — HOSPITAL ENCOUNTER (OUTPATIENT)
Dept: HOSPITAL 44 - RAD | Age: 63
End: 2018-08-27
Attending: FAMILY MEDICINE
Payer: COMMERCIAL

## 2018-08-27 VITALS
SYSTOLIC BLOOD PRESSURE: 128 MMHG | SYSTOLIC BLOOD PRESSURE: 128 MMHG | SYSTOLIC BLOOD PRESSURE: 128 MMHG | DIASTOLIC BLOOD PRESSURE: 68 MMHG | DIASTOLIC BLOOD PRESSURE: 68 MMHG | SYSTOLIC BLOOD PRESSURE: 128 MMHG | SYSTOLIC BLOOD PRESSURE: 128 MMHG | DIASTOLIC BLOOD PRESSURE: 68 MMHG | DIASTOLIC BLOOD PRESSURE: 68 MMHG | DIASTOLIC BLOOD PRESSURE: 68 MMHG

## 2018-08-27 DIAGNOSIS — R42: ICD-10-CM

## 2018-08-27 DIAGNOSIS — R07.81: Primary | ICD-10-CM

## 2018-08-27 DIAGNOSIS — J90: Primary | ICD-10-CM

## 2018-08-27 DIAGNOSIS — R05: ICD-10-CM

## 2018-08-27 LAB
BASOPHILS NFR BLD: 0.5 % (ref 0–1.5)
EGFR (AFRICAN): > 60
EGFR (NON-AFRICAN): > 60
EOSINOPHIL NFR BLD: 12.2 % (ref 0–6.8)
MCH RBC QN AUTO: 29.7 PG (ref 28–34)
MCV RBC AUTO: 91.1 FL (ref 80–100)
MONOCYTES %: 7.4 % (ref 0–11)
NEUTROPHILS #: 3.5 # K/UL (ref 1.4–7.7)

## 2018-08-27 PROCEDURE — 85025 COMPLETE CBC W/AUTO DIFF WBC: CPT

## 2018-08-27 PROCEDURE — S1016 NON-PVC INTRAVENOUS ADMINIST: HCPCS

## 2018-08-27 PROCEDURE — 82553 CREATINE MB FRACTION: CPT

## 2018-08-27 PROCEDURE — 71046 X-RAY EXAM CHEST 2 VIEWS: CPT

## 2018-08-27 PROCEDURE — 85379 FIBRIN DEGRADATION QUANT: CPT

## 2018-08-27 PROCEDURE — 84484 ASSAY OF TROPONIN QUANT: CPT

## 2018-08-27 PROCEDURE — 83880 ASSAY OF NATRIURETIC PEPTIDE: CPT

## 2018-08-27 PROCEDURE — 96365 THER/PROPH/DIAG IV INF INIT: CPT

## 2018-08-27 PROCEDURE — 80053 COMPREHEN METABOLIC PANEL: CPT

## 2018-08-27 PROCEDURE — 82550 ASSAY OF CK (CPK): CPT

## 2018-08-27 NOTE — DIAGNOSTIC IMAGING REPORT
DAVID OCAMPO 

[facility]

[address] [address]

[place], [place]. [address]

 

 

 

 

Report Submission Date: Aug 27, 2018 11:56:12 AM CDT

Patient       Study

Name:   FILEMON WEN       Date:   Aug 27, 2018 11:28:34 AM CDT

MRN:   [id number]       Modality Type:   DX

Gender:   M       Description:   CHEST

:   51       Institution:   [facility]

Physician:   DAVID OCAMPO

     Accession:    [id number]

 

 

PA and lateral chest 



History:  Cough and wheezing 



PA and lateral chest dated 2018 is compared with chest radiograph 
from February and 2018 



The cardiomediastinal silhouette is unchanged in size and configuration.  Heart 
size is normal.  There is no pleural effusion.  The previously noted right 
basilar pleural effusion and/or pleural thickening noted on the 2018 
radiograph has resolved.  There is still rib deformity at the mid to lower right
lateral ribs, probably relating to prior trauma. 



Impression: 



There is persistent deformity involving mid to lower lateral right ribs, 
unchanged and probably relating to prior trauma. 



The pleural thickening and/or pleural fluid previously noted on the right has 
resolved.  Currently, there is no active disease.

 

Electronically signed on Aug 27, 2018 11:56:12 AM CDT by:

Althea DEL CID

## 2018-08-27 NOTE — ED PHYSICIAN DOCUMENTATION
General Adult





- HISTORIAN


Historian: patient





- HPI


Stated Complaint: dizziness


Chief Complaint: General Adult


Onset: minutes


Timing: still present


Severity: moderate


Further Comments: yes (Pt is a 63 yo male with dizziness.  Pt has hx rib 

fractures that he says occurred from coughing.  He was to get a repeat chest x-

ray to evaluate his chronic complaint of R lower rib pain.  Pt became dizzy when

walking to the x-ray department.  No chest pain, n/v.)





- ROS


CONST: weakness


EYES/ENT: none


CVS/RESP: other (chronic rib pain)


GI/: none


MS/SKIN/LYMPH: none





- PAST HX


Past History: hypertension, other (heart dz, HLD)


Allergies/Adverse Reactions: 


                                    Allergies











Allergy/AdvReac Type Severity Reaction Status Date / Time


 


codeine [Codeine] Allergy Severe Anaphylaxis Verified 08/27/18 12:44














Home Medications: 


                                Ambulatory Orders











 Medication  Instructions  Recorded


 


Losartan Potassium [Cozaar] 100 mg PO DAILY 01/06/18














- SOCIAL HX


Smoking History: non-smoker





- FAMILY HX


Family History: No





- VITAL SIGNS


Vital Signs: 





                                   Vital Signs











Temp Pulse Resp BP Pulse Ox


 


          129/73    


 


          01/24/18 11:38   














- REVIEWED ASSESSMENTS


Nursing Assessment  Reviewed: Yes


Vitals Reviewed: Yes





Progress





- Progress


Progress: 





CXR: [earlier today as out-pt] There is persistent deformity involving mid to 

lower lateral right ribs, unchanged and probably relating to prior trauma.  The 

pleural thickening and/or pleural fluid previously noted on the right has 

resolved.  Currently, there is no active disease.





NS 1 L IVF





dizziness resolved





f/u pcp re: chronic rib pain.





General Adult Physical Exam





- PHYSICAL EXAM


GENERAL APPEARANCE: mild distress


EENT: pharynx normal


NECK: normal inspection, supple


RESPIRATORY: no resp distress, breath sounds normal, other (R lower chest wall 

pain, chronic)


CVS: reg rate & rhythm, heart sounds normal


ABDOMEN: soft, no organomegaly, normal bowel sounds


BACK: normal inspection, no CVA tenderness


SKIN: warm/dry, normal color


EXTREMITIES: non-tender, normal range of motion, no evidence of injury, no edema


NEURO: oriented X3, motor nml, sensation nml





Discharge


Clincal Impression: 


 transient lightheadedness, chronic rib pain





Referrals: 


Zafar Daniels MD [Primary Care Provider] - 


Condition: Stable


Disposition: 01 HOME, SELF-CARE


Decision to Admit: NO


Decision Time: 13:56

## 2018-09-14 ENCOUNTER — HOSPITAL ENCOUNTER (OUTPATIENT)
Dept: HOSPITAL 44 - PULMONARY | Age: 63
End: 2018-09-14
Attending: INTERNAL MEDICINE
Payer: COMMERCIAL

## 2018-09-14 DIAGNOSIS — G47.33: ICD-10-CM

## 2018-09-14 DIAGNOSIS — F99: ICD-10-CM

## 2018-09-14 DIAGNOSIS — J45.909: ICD-10-CM

## 2018-09-14 DIAGNOSIS — R07.82: Primary | ICD-10-CM

## 2018-09-14 PROCEDURE — 99214 OFFICE O/P EST MOD 30 MIN: CPT

## 2018-09-19 ENCOUNTER — HOSPITAL ENCOUNTER (OUTPATIENT)
Dept: HOSPITAL 44 - RAD | Age: 63
End: 2018-09-19
Attending: FAMILY MEDICINE
Payer: COMMERCIAL

## 2018-09-19 DIAGNOSIS — R07.81: ICD-10-CM

## 2018-09-19 DIAGNOSIS — J45.909: ICD-10-CM

## 2018-09-19 DIAGNOSIS — G47.33: Primary | ICD-10-CM

## 2018-09-19 LAB — EGFR (NON-AFRICAN): > 60

## 2018-09-19 PROCEDURE — 71260 CT THORAX DX C+: CPT

## 2018-09-19 PROCEDURE — 36415 COLL VENOUS BLD VENIPUNCTURE: CPT

## 2018-09-19 PROCEDURE — 80053 COMPREHEN METABOLIC PANEL: CPT

## 2018-09-19 NOTE — CONSULTATION REPORT
PRIMARY CARE PROVIDER:

Dr. Guillermo Otto



CONSULTING PHYSICIAN:

Loida Kendall MD 



CHIEF COMPLAINT:

"My ribs are really bothering me."



SIGNIFICANT PROBLEM LIST:

1.   Asthma.

2.   Obstructive sleep apnea. 

3.   Hypertension. 

4.   Gastroesophageal reflux disease (GERD).

5.   Hyperlipidemia.

6.   Environmental allergies.

7.   History of alcoholism:  Quit 20 years ago.

8.   Esophageal stricture.

9.   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 
personality, inability to manage anger. 

10. Class 2 obesity.  BMI is 39.02. 

11. Hard of hearing. 



HISTORY OF PRESENT ILLNESS: 

On August 27, 2018, the patient saw Dr. Otto in the office regarding a 
recurrent right pleural effusion, chronic rib pain, cough, and asthma.  Patient 
was subsequently referred to the pulmonary clinic.



Currently, the patient's main complaint is right rib pain.  He states that in 
November 2017, he had the flu associated with a strong cough.  He was 
hospitalized at [facility] and was discharged but, 
subsequently, went to the [facility] and was re-
hospitalized for 7 days.   The patient states that he broke some ribs on the 
right side due to his coughing.  



Patient has been diagnosed with sleep apnea.  He states that he had a 
polysomnography at [facility] in 2018.  He states that he now uses his 
CPAP regularly between 8 to 9 hours per night.  His DME is [facility].  He does 
not have any home oxygen.  



Asthma, currently the patient does not view asthma as a significant problem.  He
states that when he lived in [place], [place], and then moved to [place], that his asthma significantly improved because he has severe 
environmental allergies.  Regarding his asthma, he has never been hospitalized 
for it and never been in an intensive care unit, and has never been intubated.  
He does use his bronchodilator and he has been on antibiotics and a prednisone 
taper in the past because of his asthma.  



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 
[employer] for asbestos removal.  At that time, he started coughing 
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  He was subsequently fired allegedly due to 
racism.  



He then states that he obtained disability based on psychiatric issues and has 
been on multiple medications including Seroquel, Prozac, Zoloft, and 
amitriptyline.  The last time he took any medication was 7 to 8 years ago.  The 
last time he saw a psychiatrist was approximately 10 years ago. 



Patient also states that he stopped his Lasix medication last week because he 
had dribble in his urination.  



Patient does have GERD and occasionally takes medication for it.  He 
occasionally has postnasal drip.  He has been on prednisone and antibiotics for 
his lung.  He has had hemoptysis in the past, none recent.  He denies chest 
pain.  Denies PND.  He does have ankle edema.   



His weight, he has been gaining.  Appetite is good.  Energy level is reasonable 
with an occasional cough.  He denies any history of a DVT.  



Tobacco use:   Patient chews tobacco.



REVIEW OF SYSTEMS: 

Please see HPI for pertinent review of systems.  All other systems are negative.
 Please see [facility] intake form for further review of 
systems.



ALLERGIES: 

1.   Codeine.

2.   Venlafaxine. 



MEDICATIONS:

1.   Breo Ellipta (fluticasone 200 mcg, vilanterol 25 mcg) 1 inhalation daily.

2.   Albuterol MDI b.i.d. and p.r.n. 

3.   Unknown medicine via his nebulizer p.r.n.

4.   Metoprolol 50 mg daily.

5.   Losartan 100 mg daily.

6.   Benzonatate 100 mg p.r.n. 

7.   Gaviscon 2 tablets at bedtime.

8.   Saline nasal spray.



RECENT MEDICATIONS THAT THE PATIENT STOPPED:

1.  Furosemide 40 mg daily. 

2.  KCL 20 mEq daily. 



SOCIAL HISTORY: 

Patient lives alone.  He has no family.  No children.   He is disabled due to 
psychiatric issues. 



FAMILY HISTORY: 

Per the [facility] intake record.  Patient states that he 
does not know his family's medical history.   Per Dr. Otto's notes, mother 
had cardiovascular disease.  Father had cardiovascular disease and alcoholism. 
Brother with alcoholism.  Sister with hypertension. 



PHYSICAL EXAMINATION: 

Vital Signs:   BP:  136/79, P: 69, R: 20, T: 97.9.  Room air oxygen saturation 
was 94%.   Height:  5 feet 10 inches.  Weight:  272 pounds.  BMI was 39.02.

General:  This is an obese well-developed male in no acute distress speaking in 
full sentences.  

HEENT:  Pupils are equal and reactive to light.  Oropharynx is clear.  No 
thrush.  No erythema.

NECK:  Supple.  No JVD.  No lymphadenopathy.  

LUNGS:   Good bilateral and equal air excursion.  Clear to auscultation.   No 
wheezes, rales, or rhonchi.  No tactile fremitus.

CARDIAC:   Rate and rhythm are regular.  S1, S2, without S3 or S4.   No murmur, 
rubs, or gallops.  

ABDOMEN:   Obese.  Soft.  Positive bowel sounds.  Difficult to assess for 
organomegaly.     

EXTREMITIES:   No cyanosis, clubbing, or edema. 

NEUROLOGIC:  Alert and oriented x3.   Grossly nonfocal exam.  



IMAGING:

All imaging independently reviewed by me personally.



1.   Chest x-ray on August 27,2018.   Peribronchial cuffing.  No infiltrates.  
No effusions.  Rib deformity on the right mid-to-lower region. 

2.   Chest x-ray on March 1, 2018.   A right pleural effusion with fluid in the 
fissure.  

3.   Rib x-ray on March 1, 2018.   Cortical abnormality in the right 7th rib. 

4.   Chest x-ray on February 8, 2018.   Fluid in the fissure, blunted right 
costophrenic angle, mid-right rib irregularity.



LABORATORIES:

1.   CBC on August 27, 2018.   White count 6.2, hemoglobin 16.1, hematocrit 
49.3, platelets 247,000.

2.   Chemistries on August 27, 2018.   Sodium 138, potassium 4.3, chloride 106, 
carbon dioxide 26, BUN 14, creatinine 1, calcium 9.3, total bilirubin 0.5, AST 
21, ALT 32, alkaline phosphatase 75



ASSESSMENT AND PLAN:

PROBLEM  #1:   Chronic right rib pain. 

Patient states this is his chief complaint.  He has tried ibuprofen and Tylenol 
in the past and that does not help him much.  Patient states that this was 
trauma secondary to severe coughing.  Patient also has a history of asbestosis 
exposure and may have pleural thickening based on that.  



PLAN: 

1.   Keep pain clinic referral by Dr. Otto.

2.   Chest CT scan to assess whether there is asbestos exposure and any other 
underlying lung parenchymal issues.  



PROBLEM  #2:   Obstructive sleep apnea.

Patient states he is extremely compliant with his CPAP.



PLAN: 

1.   Obtain polysomnography from [facility] that was done some time in 
2018.

2.   Continue CPAP.



PROBLEM  #3:   Possible asbestos exposure.  

This is per the patient's history.



PLAN: 

Obtain [employer] records between 1998 to 1999 when the patient was 
on the asbestos removal team. 



PROBLEM  #4:   Asthma.

At present, patient is quite stable from an asthmatic standpoint.  



PLAN: 

1.   Obtain PFT with room air ABG.

2.   Continue Breo Ellipta (fluticasone 200 mcg, vilanterol 25 mcg).

3.   Continue albuterol MDI p.r.n. shortness of breath. 

4.   Patient to bring in the name of his nebulized medication on the next visit.




PROBLEM  #5:  Psychiatric issues that led to his disability.



PLAN: 

1.  Obtain records from [facility] regarding his psychiatric visits.

2.  Return to clinic when above plans have been completed.



cc:   Dr. Guillermo DEL CID

## 2018-09-19 NOTE — DIAGNOSTIC IMAGING REPORT
SHAIZA CROWE 

[facility]

[address] [facility] [address] [place] [address]. [id number]

 

 

 

 

Report Submission Date: Sep 19, 2018 2:35:53 PM CDT

Patient       Study

Name:   FILEMON WEN       Date:   Sep 19, 2018 10:56:33 AM CDT

MRN:   [id number]       Modality Type:   CT

Gender:   M       Description:   CT CHEST W/ CONTRAST

:   51       Institution:   [facility]

Physician:   SHAZIA CROWE

     Accession:    [id number]

 

 

Examination: CT chest 



History: HX OF ASTHMA AND ASBESTOSIS. PT STATES NON-SMOKER (Hx) 



Comparison exams: Plain film dated 2018 



Technique: CT chest with contrast protocol   



Findings: Mild bibasilar interstitial prominence. Inferior anterior segment 
right upper lung calcified granuloma. Noncalcified nodule right middle lung 
adjacent to the diaphragm measuring 7 mm. No posterior pleural effusion. 
Thoracic aorta without evidence for aneurysm. Anterior mediastinum and geovanna are 
without gross mass or pathologic adenopathy.  Cardiac silhouette not enlarged. 
No pericardial effusion. 



Lower neck structures are without gross abnormality. Bilateral renal cysts. 
Diffuse low attenuation involving the liver. Degenerative spurring of the 
thoracic vertebral bodies. 



Impression: Chronic appearing interstitial changes. 7 mm noncalcified 
nonpathologic right middle lung nodule. Consider follow up in 6 months to 
document stability. 



Fatty liver, renal cysts.

 

Electronically signed on Sep 19, 2018 2:35:53 PM CDT by:

Abad DEL CID

## 2018-09-25 ENCOUNTER — HOSPITAL ENCOUNTER (OUTPATIENT)
Dept: HOSPITAL 44 - RT | Age: 63
End: 2018-09-25
Attending: INTERNAL MEDICINE
Payer: COMMERCIAL

## 2018-09-25 DIAGNOSIS — G47.33: Primary | ICD-10-CM

## 2018-09-25 DIAGNOSIS — J45.909: ICD-10-CM

## 2018-09-25 DIAGNOSIS — R07.81: ICD-10-CM

## 2018-09-25 LAB
BASE EXCESS STD BLDA CALC-SCNC: 1.5 MMOL/L
PCO2 BLDA: 39 MMHG (ref 35–48)
PH BLDA: 7.43 [PH] (ref 7.35–7.45)
PO2 BLDA: 84 MMHG (ref 83–108)
SAO2 % BLDA FROM PO2: 98 % (ref 93–100)

## 2018-09-25 PROCEDURE — 82803 BLOOD GASES ANY COMBINATION: CPT

## 2018-09-25 PROCEDURE — 36600 WITHDRAWAL OF ARTERIAL BLOOD: CPT

## 2018-09-25 PROCEDURE — 94060 EVALUATION OF WHEEZING: CPT

## 2018-10-16 ENCOUNTER — HOSPITAL ENCOUNTER (OUTPATIENT)
Dept: HOSPITAL 44 - OUT | Age: 63
End: 2018-10-16
Attending: ANESTHESIOLOGY
Payer: COMMERCIAL

## 2018-10-16 DIAGNOSIS — J44.9: ICD-10-CM

## 2018-10-16 DIAGNOSIS — F10.21: ICD-10-CM

## 2018-10-16 DIAGNOSIS — Z87.891: ICD-10-CM

## 2018-10-16 DIAGNOSIS — R07.89: Primary | ICD-10-CM

## 2018-10-16 DIAGNOSIS — F41.8: ICD-10-CM

## 2018-10-16 PROCEDURE — 99214 OFFICE O/P EST MOD 30 MIN: CPT

## 2018-10-16 NOTE — HISTORY AND PHYSICAL REPORT
REFERRING PHYSICIAN:

Dr. Guillermo Otto



Dear Guillermo: 



HISTORY OF PRESENT ILLNESS: 

I had the opportunity of seeing Hari Tripathi today as an outpatient at [facility].  As you are aware, Mr. Tripathi is a very interesting 
63-year-old white male who has a 1-year history of right-sided chest wall pain. 
He says the pain is primarily right side anterior lateral lower quadrant over 
the chest.  He says he has had a rib fracture from coughing in the past and that
this was about a year ago and the pain has not subsided since then.  He says it 
hurts to cough or sneeze or yawn.  He says he feels that the skin is anesthetic 
over the right lower chest wall but otherwise, he hurts.  He had a physician 
provide him with what sounds like a thoracic epidural injection for thoracic 
radicular pain at one point.  He said that this did not provide him any relief. 
He says that particularly coughing and deep breathing exacerbate the symptoms 
and that they are focal and they do not radiate around to the back side.  He 
denies pain on the left side.  He denies pain in the arms or lower extremities.



PAST MEDICAL HISTORY: 

1.   History of vision problems.

2.   Hearing loss. 

3.   Heart murmur. 

4.   Hypertension. 

5.   COPD.

6.   Asthma or bronchitis.

7.   Hiatal hernia.

8.   Stomach ulcers or gastritis.

9.   Depression. 

10. Anxiety.

11. Hyperlipidemia. 

12. Alcoholism 20 years ago. 



PAST SURGICAL HISTORY: 

1.   Bilateral club foot repair. 

2.   Tonsillectomy.

3.   Esophageal stretching.

4.   Missing teeth removed.



DAILY MEDICATIONS: 

1.   Breo Ellipta 200/25 daily. 

2.   Albuterol p.r.n. 

3.   Ibuprofen 800 mg p.r.n. 

4.   Furosemide 40 mg daily. 

5.   Potassium 20 mEq daily. 

6.   Losartan 100 mg daily. 

7.   Metoprolol 50 mg daily. 



ALLERGIES: 

1.   Codeine.

2.   Effexor. 



SOCIAL HISTORY: 

He is a former tobacco chewer.  He quit 10 years ago.  History of alcoholism, 
currently drinks approximately 4 drinks per year.  He admits to occasional 
marijuana use.   He is .  He has 2 children.  He lives at home alone.  
He completed the 12th grade.  His previous occupation was a .   He is 
disabled.



FAMILY HISTORY: 

Mother with heart disease.  Father with heart disease and alcoholism.  Siblings 
with alcoholism and hypertension.



REVIEW OF SYSTEMS: 

In the last month or so, he reports a weight gain, head cold, chest pain, 
swelling in feet, feeling depressed.  Pain is worsened with coughing and 
sneezing.  Nothing improves his pain. 



PHYSICAL EXAMINATION: 

Vital Signs:  BP: 130/74, P: 80, R: 20, oxygen saturation is 97% on room air. 

General:  The patient is well nourished, well developed, and in no apparent 
distress. Awake, alert, and oriented. 

HEENT: Pupils are equal, round, and reactive to light and accommodation. 
Extraocular movements intact. No facial droop.

Neck: There is full range of motion of the cervical spine. No evidence of 
adenopathy. Thyroid is nontender, not enlarged. Carotids are without bruits.

Chest: Clear to auscultation bilaterally. Normal chest  excursion.

Heart: Regular rate and rhythm without murmur.

Abdomen: Benign. Normoactive bowel sounds.

Motor/sensory: Intact in the upper and lower extremities. Moves all extremities 
freely.

Back: There is a Tinel's sign over the costosternal junctions in the T8, T9, and
T10 positions over the rib margins which seem to re-create his symptoms.  



ASSESSMENT: 

Costosternal pain, likely secondary from a cartilaginous injury from prolonged 
coughing. 



PLAN: 

At this point, I told him that I have several ways that we treat this and I 
would like to put him on a significant dose of prednisone with a taper and 
provide him with Flector patches that he can place over the affected area for 
the next month and see if that resolves the symptoms.  Otherwise, he would need 
costosternal injections at the cartilaginous junctions likely with monitored 
anesthesia care and he tells me he does not have a  and he cannot get a 
, and I think this would be fairly invasive for the symptoms that he is 
presenting with.  So, at this point, I have explained that I would like to be 
conservative and see if we can get these symptoms to resolve with the above 
mentioned plan.  He is in agreement, and  I will be glad to follow him up in a 
month and see how he is doing.  



Dr. Otto, thank you very much for allowing me to take part in the care of 
this nice gentleman.  I appreciate the opportunity to take part in the care of 
your patients.





cc:   Dr. Guillermo DEL CID

## 2019-09-06 ENCOUNTER — HOSPITAL ENCOUNTER (EMERGENCY)
Dept: HOSPITAL 44 - ED | Age: 64
Discharge: HOME | End: 2019-09-06
Payer: MEDICARE

## 2019-09-06 VITALS
DIASTOLIC BLOOD PRESSURE: 93 MMHG | SYSTOLIC BLOOD PRESSURE: 132 MMHG | DIASTOLIC BLOOD PRESSURE: 93 MMHG | SYSTOLIC BLOOD PRESSURE: 132 MMHG

## 2019-09-06 DIAGNOSIS — M54.5: Primary | ICD-10-CM

## 2019-09-06 PROCEDURE — 99283 EMERGENCY DEPT VISIT LOW MDM: CPT

## 2019-09-06 PROCEDURE — 74176 CT ABD & PELVIS W/O CONTRAST: CPT

## 2019-09-06 PROCEDURE — 99284 EMERGENCY DEPT VISIT MOD MDM: CPT

## 2019-09-06 PROCEDURE — 81002 URINALYSIS NONAUTO W/O SCOPE: CPT

## 2019-09-06 NOTE — DIAGNOSTIC IMAGING REPORT
REYMUNDO KNIGHT 

[facility]

[address] [facility] [address]

[place], [place]. [address]

 

 

 

 

Report Submission Date: Sep 6, 2019 12:29:18 PM CDT

Patient       Study

Name:   FILEMON WEN       Date:   Sep 6, 2019 11:57:23 AM CDT

MRN:   [id number]       Modality Type:   CT\SR

Gender:   M       Description:   CT ABD /PEL W/O CONTRAST

:   51       Institution:   [facility]

Physician:   REYMUNDO KNIGHT

     Accession:    [id number]

 

 

Examination: CT Abdomen/pelvis



History: KIDNEY PAIN,PT STATES RT SIDE KIDNEY PAIN



Comparison exams: 0.6 genu 2018



Technique: CT Abdomen/pelvis without IV protocol. 



Findings:  Liver, spleen, adrenals, pancreas and gallbladder are without gross 
irregularity given exam technique. No gallstone. No suspicious renal 
calcifications. Ureters are nondilated in their course through the abdomen and 
pelvis. No central calcifications. Bladder decompressed. Abdominal aorta without
aneurysm.  Mild peripheral atherosclerotic disease. Cardiac silhouette is not 
enlarged. No pericardial effusion.



Bowel unopacified limiting evaluation. No abnormal dilation. Stool within the 
large bowel limiting sensitivity. No mesenteric inflammatory changes or free 
fluid. Appendix is visualized and is without inflammatory changes. 



Osseous structures appropriate for age. Lung bases without infiltrate. Stable 4 
mm right diaphragmatic nodule.   No effusion.



Impression: No acute upper abdominal organ inflammatory process.



No abnormal bowel dilation or inflammation. 



No gallstone.



Stable bilateral renal cysts. No suspicious renal calcifications or abnormal 
ureteric dilation.



No lung base consolidation or effusion.

 

Electronically signed on Sep 6, 2019 12:29:18 PM CDT by:

Abad DEL CID

## 2019-09-06 NOTE — ED PHYSICIAN DOCUMENTATION
Flank Pain





- HISTORIAN


Historian: patient





- HPI


Stated Complaint: kidney pain


Chief Complaint: Flank Pain


Additional Information: 





Patient presents to ED with right flank pain.  Patient was seen on 19 and 

given bactrim and tramadol for UTI.  Patient was unable to complete 7 day course

of Bactrim due to GI upset.  He took 4.5 days of bactrim.  He is still having 

right flank pain which he describes at dull ache, non-radiating, 5/10. 


Onset: days ago (10)


Duration: constant


Timing: still present


Context: denies: out of country travel


Severity: moderate


Quality: aching, dull


Associated Symptoms: none


Exacerbated by: walking





- ROS


CONST: no problems


GI/: denies: bloody urine, dark urine


CVS/RESP: none


EYES/ENT: none


MS/SKIN/LYMPH: none





- SOCIAL HX


Smoking History: non-smoker


Alcohol Use: none


Drug Use: none





- FAMILY HX


Family History: none





- PAST HX


Past History: none


Ischemic Bowel Risk Factors: none


Other History: none





- VITAL SIGNS


Vital Signs: 





                                   Vital Signs











Temp Pulse Resp BP Pulse Ox


 


 96.2 F L  77   20   132/93   96 


 


 19 11:43  19 11:43  19 11:43  19 11:43  19 11:43














- REVIEWED ASSESSMENTS


Nursing Assessment  Reviewed: Yes


Vitals Reviewed: Yes





ED Results Lab/Radiology





- Lab Results


Lab Results: 





UA -  neg blood, neg nitrite, neg leuko, specific gravity 1.020





- Radiology


Radiology Impressions: 





Report Submission Date: Sep 6, 2019 12:29:18 PM CDT


Patient       Study


Name:   FILEMON WEN       Date:   Sep 6, 2019 11:57:23 AM CDT


MRN:   [id number]       Modality Type:   CT\SR


Gender:   M       Description:   CT ABD /PEL W/O CONTRAST


:   51       Institution:   [facility]


Physician:   REYMUNDO KNIGHT


     Accession:    [id number]


 


 


Examination: CT Abdomen/pelvis





History: KIDNEY PAIN,PT STATES RT SIDE KIDNEY PAIN





Comparison exams: 0.6 genu 2018





Technique: CT Abdomen/pelvis without IV protocol. 





Findings:  Liver, spleen, adrenals, pancreas and gallbladder are without gross 

irregularity given exam technique. No gallstone. No suspicious renal 

calcifications. Ureters are nondilated in their course through the abdomen and 

pelvis. No central calcifications. Bladder decompressed. Abdominal aorta without

aneurysm.  Mild peripheral atherosclerotic disease. Cardiac silhouette is not 

enlarged. No pericardial effusion.





Bowel unopacified limiting evaluation. No abnormal dilation. Stool within the 

large bowel limiting sensitivity. No mesenteric inflammatory changes or free 

fluid. Appendix is visualized and is without inflammatory changes. 





Osseous structures appropriate for age. Lung bases without infiltrate. Stable 4 

mm right diaphragmatic nodule.   No effusion.





Impression: No acute upper abdominal organ inflammatory process.





No abnormal bowel dilation or inflammation. 





No gallstone.





Stable bilateral renal cysts. No suspicious renal calcifications or abnormal 

ureteric dilation.





No lung base consolidation or effusion.


 


Electronically signed on Sep 6, 2019 12:29:18 PM CDT by:


Abad Vasquez





- Orders


Orders: 





                                    ED Orders











 Category Date Time Status


 


 CT ABD W/O CONTRAST Stat Exams  19 Ordered














Abdominal Pain Physical Exam





- Physical Exam


General Appearance: no acute distress, alert


EENT: DENNYS


NECK: supple.  No: lymphadenopathy


RESPIRATORY: chest non-tender, breath sounds normal


CVS: reg rate & rhythm, heart sounds normal


ABDOMEN: soft, normal bowel sounds, non-tender


BACK: normal inspection, CVA tenderness (R)


SKIN: warm/dry, normal color


EXTREMITIES: non-tender, no edema


NEURO: oriented X3, mood/affect nml


Vital Signs: 





                                   Vital Signs











Temp Pulse Resp BP Pulse Ox


 


 96.2 F L  77   20   132/93   96 


 


 19 11:43  19 11:43  19 11:43  19 11:43  19 11:43














Discharge


Clincal Impression: 


Low back pain


Qualifiers:


 Chronicity: acute Back pain laterality: right Sciatica presence: without 

sciatica Qualified Code(s): M54.5 - Low back pain





Referrals: 


Primary Doctor,No [Primary Care Provider] - 2 Days


Additional Instructions: 


1. Tylenol or Ibuprofen as needed for pain


2. Stretching exercises


3.  Follow up with PCP within 1 week


4.  Return to ER for new or worsening symptoms


Condition: Stable


Disposition: 01 HOME, SELF-CARE


Decision to Admit: NO


Date of Decison to Admit: 19


Decision Time: 12:40

## 2019-12-02 ENCOUNTER — HOSPITAL ENCOUNTER (OUTPATIENT)
Dept: HOSPITAL 44 - OPSURG | Age: 64
Discharge: HOME | End: 2019-12-02
Attending: INTERNAL MEDICINE
Payer: MEDICARE

## 2019-12-02 DIAGNOSIS — K62.1: ICD-10-CM

## 2019-12-02 DIAGNOSIS — Z12.11: Primary | ICD-10-CM

## 2019-12-02 DIAGNOSIS — D12.0: ICD-10-CM

## 2019-12-02 PROCEDURE — 45385 COLONOSCOPY W/LESION REMOVAL: CPT

## 2019-12-06 NOTE — GI REPORT
DATE OF PROCEDURE:   12/02/2019.  

 

REFERRING PROVIDER:  Annie Lewis NP.



PROCEDURE PERFORMED:  Colonoscopy and polypectomy.



SURGEON:  Donald Gerhardt, M.D., [place].



INDICATION FOR PROCEDURE:  64-year-old man who had polyps he thought removed 10 
years ago, so this is a screening colonoscopy. He denies any change in stool. He
does have sleep apnea, and marked central obesity. He is on CPAP, on steroids 
and inhalers for his breathing. He is 6 foot, and weighs 276 lbs and carries all
that weight centrally. 



PROCEDURE MEDICATION:  Propofol, as per Anesthesia. 



DESCRIPTION OF PROCEDURE:  The Olympus video colonoscope was advanced through 
the rectum. In the rectum there were 2 polyps, 3-4 mm size, removed with a cold 
snare. The colonoscope was slowly advanced all the way to the cecum. In the base
of the cecum the patient had a 3-4 mm flat polyp, again removed with a cold 
snare. On slow withdrawal, the ascending colon, transverse colon  redundancy but
no obvious intraluminal lesions were noted. Descending colon and sigmoid: Again 
redundancy, but no obvious intraluminal lesions were noted. Retroflexion in the 
rectum: The 2 polyps seen were removed with cold snare. The patient tolerated 
the procedure well. We were light on the sedation because of his breathing 
issues. 



FINDINGS:   Three polyps removed, 2 in the rectum, 1 in the cecum. 



RECOMMENDATIONS:  

1. High fiber diet.

2. Weight reduction, cutting back on carbohydrates would be beneficial. 

3. Pending pathology of the polyps, would have his colon re-looked at again in 5
years, sooner if clinically indicated. 

 







DONALD GERHARDT, M.D., [email or web address].   

[place]

D:  12/02/19

R:  12/04/19

T:  12/06/19

Job#: [id number]

 

Cc:  Annie Lewis NP

[facility]